# Patient Record
Sex: FEMALE | Race: WHITE | Employment: STUDENT | URBAN - METROPOLITAN AREA
[De-identification: names, ages, dates, MRNs, and addresses within clinical notes are randomized per-mention and may not be internally consistent; named-entity substitution may affect disease eponyms.]

---

## 2017-03-08 ENCOUNTER — APPOINTMENT (OUTPATIENT)
Dept: PHYSICAL THERAPY | Age: 52
End: 2017-03-08

## 2017-03-27 ENCOUNTER — HOSPITAL ENCOUNTER (OUTPATIENT)
Dept: PHYSICAL THERAPY | Age: 52
Discharge: HOME | End: 2017-03-27
Payer: COMMERCIAL

## 2017-03-27 VITALS — HEIGHT: 68 IN | WEIGHT: 158 LBS | BODY MASS INDEX: 23.95 KG/M2

## 2017-03-27 PROCEDURE — 97110 THERAPEUTIC EXERCISES: CPT

## 2017-03-27 PROCEDURE — 97140 MANUAL THERAPY 1/> REGIONS: CPT

## 2017-03-27 PROCEDURE — 97161 PT EVAL LOW COMPLEX 20 MIN: CPT

## 2017-03-27 NOTE — PROGRESS NOTES
[facility]  Physical Therapy Lymphedema Clinic  [address] [place] [address], [address], [facility] 22.    INITIAL EVALUATION    NAME: Beth Oseguera AGE: 47 y.o. 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 She completed our therapy sessions and is now returning for a 6 month evaluation. She reports that she has noticed new swelling in her R lateral trunk that is not improving. Lymphedema Life Impact Scale: Score of 10 and impairment percentage of 15%. See scanned document. Past Medical History: No past medical history on file. Past Surgical History:   Procedure Laterality Date    HX HYSTERECTOMY      HX TONSILLECTOMY     Per patient report:  Node removed from R axilla in June 2015, chemotherapy that began in June 2015. B mastectomies with B axillary node dissection with reconstructive surgery, expander/implant exchange 2/2016, and final surgery in mid 2016 for nipple and areola surgery late May 2016. Current Medications:    No current outpatient prescriptions on file. No current facility-administered medications for this encounter.     Per patient report:  Lexapro 10 mg once a day                                 Tamoxifen 20 mg once a day                                 claritin 10 mg once a day                                 Vitamin D 2000 ui once a day                                 B6 and 12 complex with 1000 mg Biotin once a day                                 Melatonin 10 mg once a day                                 Vagifem 3 times a week                                 Allergies: Allergies   Allergen Reactions    Tetracycline Rash    Per patient report:  Codeine, sulfa, dexamethasone, Compazine, adhesive tape     Prior Level of Function/Social/Work History/Personal factors and/or comorbidities impacting plan of care: 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 She is  and has a family and is performing normal housework and activities in the home. She enjoys gardening. She has had swelling in her R thumb almost constantly but has done well with kinesio taping. She has now developed swelling along her R axilla and lateral chest wall. She will see the plastic surgeon on Wednesday just to ensure that there is not a leak of her implants. She also has the history of B mastectomies, B axillary lymph node dissection and reconstructive surgery   Living Situation: , lives with family, full time student      Trainable Caregiver?: yes, family if needed   Self-care/ADLs:  I     Mobility: I   Sleeping Arrangement:  In a bed   Adaptive Equipment Owned: none   Other: pt has had difficulty getting the correct compression garments from the vendor, [employer]  Previous Therapy:  June-September 2016 in our lymphedema clinic  Compression/Lymphedema Equipment:  Lymphediva arm sleeves and gloves in size small, long                                                                           Flexitouch    SUBJECTIVE:  Pt finally received compression garments from [facility] after a very long time. Many issues with the vendor. She only received two sets of garments and received the gauntlets and not the gloves. She has brought in her garments today for us to sort out and get corrected by the vendor. Patient is reporting new swelling along R axilla and lateral chest wall. She also sees some in the forearm area of the R as well. She has had some swelling noted in the L UE as well but it has resolved at this time. Patients goals for therapy: \"to get the compression garments straight as I will be flying soon for vacation. \"    OBJECTIVE DATA SUMMARY:   EXAMINATION/PRESENTATION/DECISION MAKING:   Pain:  Pain Scale 1: Numeric (0 - 10)     Pain Intensity 1: 2     Pain Location 1: Chest     Pain Orientation 1: Right, Lateral     Patient Stated Pain Goal: 0       Self-Care and ADLs:  Grooming: I Bathing: showers   UB Dressing: I LB Dressing: I   Don/Doff Shoes/Socks:  I Toileting: I   Other: n/a    Skin and Tissue Assessment:  Dermal Status:    (x )  Intact ( )  Dry   ( )  Tenuous ( )  Flaky   ( )  Wound/lesion present ( )  Scars   ( )  Dermatitis    Texture/Consistency:  ( x)  Boggy-R axilla and upper lateral chest wall which is fairly new in onset,  R thumb ( )  Pitting Edema   ( )  Brawny ( )  Combination   ( )  Fibrotic/Woody    Pigmentation/Color Change:  ( x)  Normal ( )  Hemosiderin   ( )  Red ( )  Erythematous   ( )  Hyperpigmented ( )  Hyperlipodermatosclerosis   Anomalies:  ( )  Lymphorrhea ( )  Vesicles   ( )  Petechiae ( )  Warty Vercusis   ( )  Bullae ( )  Papilloma   Circulatory:  ( )  LATANYA ( )  Varicosities:   ( )  Pulses ( )  Vascular studies ruled out DVT in past   ( )  DVT History    Nails:  (x )  Normal  ( )  Fungus  Stemmers Sign: negative  Height:  Height: 5' 8\" (172.7 cm)  Weight:  Weight: 71.7 kg (158 lb)   BMI:  BMI (calculated): 24  (36 or greater: adversely affecting lymphedema)  Wound/Ulcer:  none      Wound Pain:   n/a  Volumetric Measurements: UEs  Right:  2,402 mL (gain of 77 ml since 9/19/16) Left:  2,216 mL (loss of 74 ml since 9/9/16)   % Difference: 8% (increase from 2% since 9/19/16) Dominance: R   (See scanned graph)  Range of Motion: functional times 4 extremities  Strength:  WNL  Sensation:  Intact    Mobility:    Bed/Chair Mobility:  I Transfers: I   Sitting Balance:  normal Standing Balance:  good   Gait:  I gait without any assistive device for community distances Wheelchair Mobility:  n/a   Endurance:  good Stairs: I       Safety:  Patient is alert and oriented:  yes   Safety awareness:  good   Fall Risk?:  no   Patient given written fall prevention handout: Yes   Precautions:  Standard lymphedema precautions to include avoiding blood pressure readings, injections and IVs or other procedures/acts that could lead to broken skin on affected area, and avoiding excessive heat, resistive activity or altitude without compression garment      Based on the above components, the patient evaluation is determined to be of the following complexity level: LOW     Evaluation Time: 10:15-10:30 am  TREATMENT PROVIDED:   1. Treatment description:  Therapeutic Exercise and Procedure: Patient instructed in activity restrictions and exercise guidelines. Therapist demonstrated deep abdominal breathing and a discussion of her home program of lymphedema exercise routines occurred. She has not been performing a routine in quite a while but has instead been performing isolated exercises from the routines throughout the day. She still has her written handouts and has good knowledge of the exercises and is more than willing to get back to daily performance of one set of exercises. Treatment time:  10:30-10:45 am  Minutes: 15    2. Treatment description:  Manual Therapy: Patient instructed in skin care principles and anatomy of the lymphatic system. Therapist able to demonstrate manual lymphatic drainage techniques for the drainage of B UEs and skin care protocol.   Pt was shown her self MLD sequence with the addition of stressing the R AI pathway to help relieve the lateral R chest wall swelling that she is experiencing. She was instructed in using the stationary hand [place] stroke to work the R AI pathway and was able to demonstrate understanding. She had been using her flexitouch nightly but with school consuming so much of her time, she is only able to use the device 2-3 times a week and feels that she is really missing the benefits form use of it nightly. She is still able to apply kinesiotape to her R thumb which she states is really benefiting her. She has brought in her compression arm sleeves and gauntlets that she finally received from the order that was placed in September 2016. The sleeves are correct but pt ordered R gloves and not the R gauntlets that she received. [address] [place] [address] was called and they will exchange the R gauntlets for gloves. Pt will be flying soon and will also need compression sleeves and hand pieces for the L UE as she had axillary lymph nodes removed from that side as well. She was measured today and an order will be submitted to [address] [place] [address] as patient has experience stage 1 lymphedema in the L UE as well. Currently the arm is doing fairly well but in the past she has developed swelling in the warmer months. She also will be risk for swelling with air travel and other high risk activities. Treatment time:  10:45-11 am  Minutes: 15    ASSESSMENT:   Rubio Xavier is a 47 y.o. female who presents with stage 2 R UE/ truncal secondary lymphedema and stage 1 L UE secondary lymphedema. Patient will benefit from complete decongestive therapy (CDT) including manual lymphatic drainage (MLD) technique, short-stretch textile bandages/compression system to decongest limb, and kinesiotaping as appropriate. Patient will receive instruction in proper skin care to recognize signs/symptoms of and prevent infection, therapeutic exercise, and self-MLD for independent home program and restorative lymphatic performance.     This care is medically necessary due to the infection risk with lymphedema and to improve functional activities. CDT is necessary to resolve swelling to allow patient to return to wearing normal clothes and prevent worsening of symptoms, such as infections or hospitalizations. Patient will be independent with home program strategies to allow improved ADL ability and mobility and to allow patient to return to greatest functional independence. Rehabilitation potential is considered to be good. Factors which may influence rehabilitation potential include good knowledge of her home program.  Patient will benefit from 6 month evaluation in physical therapy to optimize improvement in these areas. PLAN OF CARE:   Recommendations and Planned Interventions:  Manual lymph drainage/complete decongestive therapy  Multi-layer compression bandaging (short-stretch)  Compression garment fitting/provision  Lymphedema therapeutic exercise  AROM/PROM/Strength/Coordination  Self-care training  Functional mobility training  Education in skin care and lymphedema precautions  Self-MLD education per home program  Self-bandaging education per home program  Caregiver education as needed  Wound care as needed     GOALS  1. Order for additional compression garments for the L UE and corrected order for the R gloves will be placed thru [facility].  2.  Pt will continue with home program of remedial lymphedema exercises, self MLD and use of the flexitouch, compression garments for high risk activities, and skin care. Patient has participated in goal setting and agrees to work toward plan of care. Patient was instructed to call if questions or concerns arise. Thank you for this referral.  Teresa Odom, PT   Time Calculation: 45 mins    TREATMENT PLAN EFFECTIVE DATES:   3/27/2017 TO 3/27/2017  I have read the above plan of care for Aneita Ahumada. I certify the above prescribed services are required by this patient and are medically necessary.   The above plan of care has been developed in conjunction with the lymphedema/physical therapist.       Physician Signature: ____________________________________Date:______________                                               Ming Ureña.  Baudilio Mejia MD

## 2017-09-13 ENCOUNTER — HOSPITAL ENCOUNTER (OUTPATIENT)
Dept: PHYSICAL THERAPY | Age: 52
Discharge: HOME | End: 2017-09-13
Payer: COMMERCIAL

## 2017-09-13 VITALS — BODY MASS INDEX: 25.67 KG/M2 | WEIGHT: 169.4 LBS | HEIGHT: 68 IN

## 2017-09-13 PROCEDURE — 97140 MANUAL THERAPY 1/> REGIONS: CPT

## 2017-09-13 PROCEDURE — 97161 PT EVAL LOW COMPLEX 20 MIN: CPT

## 2019-04-22 ENCOUNTER — OFFICE VISIT (OUTPATIENT)
Dept: NEUROLOGY | Age: 54
End: 2019-04-22

## 2019-04-22 VITALS
SYSTOLIC BLOOD PRESSURE: 114 MMHG | DIASTOLIC BLOOD PRESSURE: 64 MMHG | WEIGHT: 173.6 LBS | HEART RATE: 107 BPM | HEIGHT: 68 IN | BODY MASS INDEX: 26.31 KG/M2 | OXYGEN SATURATION: 95 %

## 2019-04-22 DIAGNOSIS — G44.86 CERVICOGENIC HEADACHE: ICD-10-CM

## 2019-04-22 DIAGNOSIS — G24.3 CERVICAL DYSTONIA: Primary | ICD-10-CM

## 2019-04-22 RX ORDER — ESTRADIOL 10 UG/1
10 INSERT VAGINAL DAILY
COMMUNITY

## 2019-04-22 RX ORDER — MELATONIN
DAILY
COMMUNITY

## 2019-04-22 RX ORDER — BISMUTH SUBSALICYLATE 262 MG
1 TABLET,CHEWABLE ORAL DAILY
COMMUNITY

## 2019-04-22 RX ORDER — CYCLOBENZAPRINE HCL 10 MG
10 TABLET ORAL
Qty: 90 TAB | Refills: 0 | Status: SHIPPED | OUTPATIENT
Start: 2019-04-22 | End: 2019-08-01 | Stop reason: SINTOL

## 2019-04-22 RX ORDER — SPIRONOLACTONE 50 MG/1
TABLET, FILM COATED ORAL DAILY
COMMUNITY

## 2019-04-22 RX ORDER — GLUCOSAMINE/CHONDR SU A SOD 750-600 MG
TABLET ORAL
COMMUNITY

## 2019-04-22 RX ORDER — LORATADINE 10 MG/1
10 TABLET ORAL
COMMUNITY

## 2019-04-22 RX ORDER — LEVOTHYROXINE SODIUM 50 UG/1
TABLET ORAL
COMMUNITY

## 2019-04-22 NOTE — PATIENT INSTRUCTIONS
Torticollis: Care Instructions Your Care Instructions Torticollis is a severe tightness of the muscles on one side of the neck. The tight muscles can make the head turn to one side, lean to one side, or be pulled forward or backward. It is also called wryneck. Your doctor asked questions about your health and examined you. You may also have had X-rays or other tests. If your doctor thinks another medical problem is causing your tight neck muscles, you may need more tests. Torticollis usually gets better with home care. Your doctor may have you take medicine to relieve pain or relax your muscles. He or she may suggest exercise and physical therapy to help increase flexibility and relieve stress. Your doctor may also have you wear a special collar, called a cervical collar, for a day or two. The collar may help make your neck more comfortable. Follow-up care is a key part of your treatment and safety. Be sure to make and go to all appointments, and call your doctor if you are having problems. It's also a good idea to know your test results and keep a list of the medicines you take. How can you care for yourself at home? · Be safe with medicines. Read and follow all instructions on the label. ? If the doctor gave you a prescription medicine for pain, take it as prescribed. ? If you are not taking a prescription pain medicine, ask your doctor if you can take an over-the-counter medicine. · Try using a heating pad on a low or medium setting for 15 to 20 minutes every 2 or 3 hours. Try a warm shower in place of one session with the heating pad. · Try using an ice pack for 10 to 15 minutes every 2 to 3 hours. Put a thin cloth between the ice and your skin. · If your doctor recommends a cervical collar, wear it exactly as directed. When should you call for help? Call your doctor now or seek immediate medical care if: 
  · You have new or worse numbness in your arms, buttocks, or legs.   · You have new or worse weakness in your arms or legs.  
  · Your neck pain gets worse.  
  · You lose bladder or bowel control.  
 Watch closely for changes in your health, and be sure to contact your doctor if: 
  · You do not get better as expected. Where can you learn more? Go to http://maite-hilda.info/. Enter H099 in the search box to learn more about \"Torticollis: Care Instructions. \" Current as of: September 20, 2018 Content Version: 11.9 © 7143-0053 [employer]. Care instructions adapted under license by [employer] (which disclaims liability or warranty for this information). If you have questions about a medical condition or this instruction, always ask your healthcare professional. [address] any warranty or liability for your use of this information. Headache: Care Instructions Your Care Instructions Headaches have many possible causes. Most headaches aren't a sign of a more serious problem, and they will get better on their own. Home treatment may help you feel better faster. The doctor has checked you carefully, but problems can develop later. If you notice any problems or new symptoms, get medical treatment right away. Follow-up care is a key part of your treatment and safety. Be sure to make and go to all appointments, and call your doctor if you are having problems. It's also a good idea to know your test results and keep a list of the medicines you take. How can you care for yourself at home? · Do not drive if you have taken a prescription pain medicine. · Rest in a quiet, dark room until your headache is gone. Close your eyes and try to relax or go to sleep. Don't watch TV or read. · Put a cold, moist cloth or cold pack on the painful area for 10 to 20 minutes at a time. Put a thin cloth between the cold pack and your skin.  
· Use a warm, moist towel or a heating pad set on low to relax tight shoulder and neck muscles. · Have someone gently massage your neck and shoulders. · Take pain medicines exactly as directed. ? If the doctor gave you a prescription medicine for pain, take it as prescribed. ? If you are not taking a prescription pain medicine, ask your doctor if you can take an over-the-counter medicine. · Be careful not to take pain medicine more often than the instructions allow, because you may get worse or more frequent headaches when the medicine wears off. · Do not ignore new symptoms that occur with a headache, such as a fever, weakness or numbness, vision changes, or confusion. These may be signs of a more serious problem. To prevent headaches · Keep a headache diary so you can figure out what triggers your headaches. Avoiding triggers may help you prevent headaches. Record when each headache began, how long it lasted, and what the pain was like (throbbing, aching, stabbing, or dull). Write down any other symptoms you had with the headache, such as nausea, flashing lights or dark spots, or sensitivity to bright light or loud noise. Note if the headache occurred near your period. List anything that might have triggered the headache, such as certain foods (chocolate, cheese, wine) or odors, smoke, bright light, stress, or lack of sleep. · Find healthy ways to deal with stress. Headaches are most common during or right after stressful times. Take time to relax before and after you do something that has caused a headache in the past. 
· Try to keep your muscles relaxed by keeping good posture. Check your jaw, face, neck, and shoulder muscles for tension, and try relaxing them. When sitting at a desk, change positions often, and stretch for 30 seconds each hour. · Get plenty of sleep and exercise. · Eat regularly and well. Long periods without food can trigger a headache. · Treat yourself to a massage. Some people find that regular massages are very helpful in relieving tension. · Limit caffeine by not drinking too much coffee, tea, or soda. But don't quit caffeine suddenly, because that can also give you headaches. · Reduce eyestrain from computers by blinking frequently and looking away from the computer screen every so often. Make sure you have proper eyewear and that your monitor is set up properly, about an arm's length away. · Seek help if you have depression or anxiety. Your headaches may be linked to these conditions. Treatment can both prevent headaches and help with symptoms of anxiety or depression. When should you call for help? Call 911 anytime you think you may need emergency care. For example, call if: 
  · You have signs of a stroke. These may include: 
? Sudden numbness, paralysis, or weakness in your face, arm, or leg, especially on only one side of your body. ? Sudden vision changes. ? Sudden trouble speaking. ? Sudden confusion or trouble understanding simple statements. ? Sudden problems with walking or balance. ? A sudden, severe headache that is different from past headaches.  
 Call your doctor now or seek immediate medical care if: 
  · You have a new or worse headache.  
  · Your headache gets much worse. Where can you learn more? Go to http://maite-hilda.info/. Enter M271 in the search box to learn more about \"Headache: Care Instructions. \" Current as of: Elke 3, 2018 Content Version: 11.9 © 9280-9357 [employer]. Care instructions adapted under license by [employer] (which disclaims liability or warranty for this information). If you have questions about a medical condition or this instruction, always ask your healthcare professional. [place] [address] any warranty or liability for your use of this information. Neck: Exercises Your Care Instructions Here are some examples of typical rehabilitation exercises for your condition. Start each exercise slowly. Ease off the exercise if you start to have pain. Your doctor or physical therapist will tell you when you can start these exercises and which ones will work best for you. How to do the exercises Neck stretch 1. This stretch works best if you keep your shoulder down as you lean away from it. To help you remember to do this, start by relaxing your shoulders and lightly holding on to your thighs or your chair. 2. Tilt your head toward your shoulder and hold for 15 to 30 seconds. Let the weight of your head stretch your muscles. 3. If you would like a little added stretch, use your hand to gently and steadily pull your head toward your shoulder. For example, keeping your right shoulder down, lean your head to the left. 4. Repeat 2 to 4 times toward each shoulder. Diagonal neck stretch 1. Turn your head slightly toward the direction you will be stretching, and tilt your head diagonally toward your chest and hold for 15 to 30 seconds. 2. If you would like a little added stretch, use your hand to gently and steadily pull your head forward on the diagonal. 
3. Repeat 2 to 4 times toward each side. Dorsal glide stretch 1. Sit or stand tall and look straight ahead. 2. Slowly tuck your chin as you glide your head backward over your body 3. Hold for a count of 6, and then relax for up to 10 seconds. 4. Repeat 8 to 12 times. Chest and shoulder stretch 1. Sit or stand tall and glide your head backward as in the dorsal glide stretch. 2. Raise both arms so that your hands are next to your ears. 3. Take a deep breath, and as you breathe out, lower your elbows down and behind your back. You will feel your shoulder blades slide down and together, and at the same time you will feel a stretch across your chest and the front of your shoulders. 4. Hold for about 6 seconds, and then relax for up to 10 seconds. 5. Repeat 8 to 12 times. Strengthening: Hands on head 1. Move your head backward, forward, and side to side against gentle pressure from your hands, holding each position for about 6 seconds. 2. Repeat 8 to 12 times. Follow-up care is a key part of your treatment and safety. Be sure to make and go to all appointments, and call your doctor if you are having problems. It's also a good idea to know your test results and keep a list of the medicines you take. Where can you learn more? Go to http://maite-hilda.info/. Enter P975 in the search box to learn more about \"Neck: Exercises. \" Current as of: September 20, 2018 Content Version: 11.9 © 9700-4173 [employer]. Care instructions adapted under license by [employer] (which disclaims liability or warranty for this information). If you have questions about a medical condition or this instruction, always ask your healthcare professional. [place] [address] any warranty or liability for your use of this information. Shoulder Blade: Exercises Your Care Instructions Here are some examples of typical exercises for your condition. Start each exercise slowly. Ease off the exercise if you start to have pain. Your doctor or physical therapist will tell you when you can start these exercises and which ones will work best for you. How to do the exercises Shoulder roll 1. Stand tall with your chin slightly tucked. Imagine that a string at the top of your head is pulling you straight up. 2. Keep your arms relaxed. All motion will be in your shoulders. 3. Shrug your shoulders up toward your ears, then up and back. [place] your shoulders down and back, like you're sliding your hands down into your back pants pockets. 4. Repeat the circles at least 2 to 4 times. 5. This exercise is also helpful anytime you want to relax. Lower neck and upper back stretch 1. With your arms about shoulder height, clasp your hands in front of you. 2. Drop your chin toward your chest. 
3. Reach straight forward so you are rounding your upper back. Think about pulling your shoulder blades apart. [place] feel a stretch across your upper back and shoulders. Hold for at least 6 seconds. 4. Repeat 2 to 4 times. Triceps stretch 1. Reach your arm straight up. 2. Keeping your elbow in place, bend your arm and reach your hand down behind your back. 3. With your other hand, apply gentle pressure to the bent elbow. [place] feel a stretch at the back of your upper arm and shoulder. Hold about 6 seconds. 4. Repeat 2 to 4 times with each arm. Shoulder stretch 1. Relax your shoulders. 2. Raise one arm to shoulder height, and reach it across your chest. 
3. Pull the arm slightly toward you with your other arm. This will help you get a gentle stretch. Hold for about 6 seconds. 4. Repeat 2 to 4 times. Shoulder blade squeeze 1. Sit or stand up tall with your arms at your sides. 2. Keep your shoulders relaxed and down, not shrugged. 3. Squeeze your shoulder blades together. Hold for 6 seconds, then relax. 4. Repeat 8 to 12 times. Straight-arm shoulder blade squeeze 1. Sit or stand tall. Relax your shoulders. 2. With palms down, hold your elastic tubing or band straight out in front of you. 3. Start with slight tension in the tubing or band, with your hands about shoulder-width apart. 4. Slowly pull straight out to the sides, squeezing your shoulder blades together. Keep your arms straight and at shoulder height. Slowly release. 5. Repeat 8 to 12 times. Rowing 1. [place] your elastic tubing or band at about waist height. Take one end in each hand. 2. Sit or stand with your feet hip-width apart. 3. Hold your arms straight in front of you. Adjust your distance to create slight tension in the tubing or band. 4. Slightly tuck your chin. Relax your shoulders. 5. Without shrugging your shoulders, pull straight back.  Your elbows will pass alongside your waist. 
 
Pull-downs 1. [place] your elastic tubing or band in the top of a closed door. Take one end in each hand. 2. Either sit or stand, depending on what is more comfortable. If you feel unsteady, sit on a chair. 3. Start with your arms up and comfortably apart, elbows straight. There should be a slight tension in the tubing or band. 4. Slightly tuck your chin, and look straight ahead. 5. Keeping your back straight, slowly pull down and back, bending your elbows. 6. Stop where your hands are level with your chin, in a \"goalpost\" position. 7. Repeat 8 to 12 times. Chest T stretch 1. Lie on your back. Raise your knees so they are bent. Plant your feet on the floor, hip-width apart. 2. Tuck your chin, and relax your shoulders. 3. Reach your arms straight out to the sides. If you don't feel a mild stretch in your shoulders and across your chest, use a foam roll or a tightly rolled blanket under your spine, from your tailbone to your head. 4. Relax in this position for at least 15 to 30 seconds while you breathe normally. Repeat 2 to 4 times. 5. As you get used to this stretch, keep adding a little more time until you are able relax in this position for 2 or 3 minutes. When you can relax for at least 2 minutes, you only need to do the exercise 1 time per session. Chest goalpost stretch 1. Lie on your back. Raise your knees so they are bent. Plant your feet on the floor, hip-width apart. 2. Tuck your chin, and relax your shoulders. 3. Reach your arms straight out to the sides. 4. Bend your arms at the elbows, with your hands pointed toward the top of your head. Your arms should make an L on either side of your head. Your palms should be facing up. 5. If you don't feel a mild stretch in your shoulders and across your chest, use a foam roll or tightly rolled blanket under your spine, from your tailbone to your head. 6. Relax in this position for at least 15 to 30 seconds while you breathe normally. Repeat 2 to 4 times. 7. Each day you do this exercise, add a little more time until you can relax in this position for 2 or 3 minutes. When you can relax for at least 2 minutes, you only need to do the exercise 1 time per session. Follow-up care is a key part of your treatment and safety. Be sure to make and go to all appointments, and call your doctor if you are having problems. It's also a good idea to know your test results and keep a list of the medicines you take. Where can you learn more? Go to http://maite-hilda.info/. Enter [phone] in the search box to learn more about \"Shoulder Blade: Exercises. \" Current as of: September 20, 2018 Content Version: 11.9 © 4429-2088 [employer]. Care instructions adapted under license by [employer] (which disclaims liability or warranty for this information). If you have questions about a medical condition or this instruction, always ask your healthcare professional. [address] any warranty or liability for your use of this information.

## 2019-04-22 NOTE — LETTER
4/22/19 Patient: Rosette Howard YOB: 1965 Date of Visit: 4/22/2019 Deisi Hernandez MD 
[address] [address] VIA Facsimile: [phone] Dear Deisi Hernandez MD, Thank you for referring Ms. Alondra Esparza to [address] [place] [address] for evaluation. My notes for this consultation are attached. If you have questions, please do not hesitate to call me. I look forward to following your patient along with you. Sincerely, Rahul Ortega MD

## 2019-04-22 NOTE — PROGRESS NOTES
NEUROLOGY CLINIC NOTE Patient ID: 
April Mendez [id number] 
49 y.o. 
1965 Date of Consultation:  April 22, 2019 Referring Physician: Dr. Caitlin Hamlin Reason for Consultation:  headaches Chief Complaint Patient presents with  New Patient [place] Headaches History of Present Illness: There are no active problems to display for this patient. Past Medical History:  
Diagnosis Date  Cancer ([address] Breast  
 Thyroid disease Past Surgical History:  
Procedure Laterality Date  BREAST SURGERY PROCEDURE UNLISTED  HX HYSTERECTOMY  HX TONSILLECTOMY Prior to Admission medications Medication Sig Start Date End Date Taking? Authorizing Provider  
liraglutide (SAXENDA) 3 mg/0.5 mL (18 mg/3 mL) pen 3 mg by SubCUTAneous route daily. Yes Provider, Historical  
spironolactone (ALDACTONE) 50 mg tablet Take  by mouth daily. Yes Provider, Historical  
levothyroxine (SYNTHROID) 50 mcg tablet Take  by mouth Daily (before breakfast). Yes Provider, Historical  
estradiol (VAGIFEM) 10 mcg tab vaginal tablet Insert 10 mcg into vagina daily. Yes Provider, Historical  
loratadine (CLARITIN) 10 mg tablet Take 10 mg by mouth. Yes Provider, Historical  
Biotin 2,500 mcg cap Take  by mouth. Yes Provider, Historical  
multivitamin (ONE A DAY) tablet Take 1 Tab by mouth daily. Yes Provider, Historical  
cholecalciferol (VITAMIN D3) 1,000 unit tablet Take  by mouth daily. Yes Provider, Historical  
 
Allergies Allergen Reactions  Codeine Nausea and Vomiting  Compazine [Prochlorperazine] Rash  Dexamethasone Other (comments) Tachycardia  Sulfa (Sulfonamide Antibiotics) Nausea and Vomiting  Tetracycline Rash Social History Tobacco Use  Smoking status: Never Smoker  Smokeless tobacco: Never Used Substance Use Topics  Alcohol use: Yes Alcohol/week: 1.0 oz Types: 2 Glasses of wine per week History reviewed. No pertinent family history. Subjective:  
  
Steven Betancourt is a 49 y.o. IXYA with history of breast cancer and thyroid disease who was referred here by Dr. Teresa Weinberg for further evaluation of his headaches. Per patient she has had two episodes of headaches. First episode was 2/17/2019 while having sex. Abrupt onset of back of the head pain. Like a baseball bat hit her from behind the head. 10/10 and worst pain ever in her life. She could not breath or speak, just held the back of her head due to the pain. No associated nausea or vomiting. Lasted for 1 hour. Residual headache 1-2/10 for 2 weeks. Did not try to take any OTC medications. Per patient she saw her PCP. Note from Dr. Roland Bishop dated 2/27/2019 was reviewed. Mentions the headache during sex. Ordered a brain MRA which likely was not approved by her insurance. Patient was advised to go to the nearest ER if symptoms recur. Patient then referred here. Few weeks PTC while doing physical therapy for her feet she had another bout of same but less intense headache. She was laying down and pushing her feet on a resistance. Develop same headache but less intense. Back of the head pain, 4-5/10, pressure pain at the occiput. Lasting until the evening and went away after several days. None since then. Outside reports reviewed: office notes Review of Systems: A comprehensive review of systems was performed:  
Constitutional: positive for fatigue Eyes: positive for none Ears, nose, mouth, throat, and face: positive for none Respiratory: positive for none Cardiovascular: positive for none Gastrointestinal: positive for none Genitourinary: positive for none Integument/breast: positive for none Hematologic/lymphatic: positive for none Musculoskeletal: positive for joint pain Neurological: positive for headache, paresthesia Behavioral/Psych: positive for none Endocrine: positive for none Allergic/Immunologic: positive for none Objective:  
 
Visit Vitals /64 Pulse (!) 107 Ht 5' 8\" (1.727 m) Wt 173 lb 9.6 oz (78.7 kg) SpO2 95% BMI 26.40 kg/m² PHYSICAL EXAM: 
 
General Appearance: Alert, patient appears stated age. General:  Well developed, well nourished patent in no apparent distress. Head/Face: The head is normocephalic and atraumatic. Eyes: Conjunctivae appear normal. Sclera appear normal and non-icteric. Nose (and Sinus):   No abnormality of the nose or sinuses is noted. Oral:   Throat is clear. Lymphatics:  No lymphadenopathy in the neck/head. Neck and Thyroid:   No bruits noted in the neck. Respiratory:  Lungs clear to auscultation. Cardiovascular:  Palpation and auscultation: regular rate and rhythm. Extremity: No joint swelling, erythema or pedal edema. NEUROLOGICAL EXAM: 
 
Appearance: The patient is well developed, well nourished, provides a coherent history and is in no acute distress. Mental Status: Oriented to time, place and person. Fluent, no aphasia or dysarthria. Mood and affect appropriate. Cranial Nerves:   Intact visual fields. VA 20/20 OU on near vision with correction. FELIZ, EOM's full, no nystagmus, no ptosis. Facial sensation is normal. Corneal reflexes are intact. Facial movement is symmetric. Hearing is normal bilaterally. Palate is midline with normal elevation. Sternocleidomastoid and trapezius muscles are normal. Tongue is midline. Motor:  5/5 strength in upper and lower proximal and distal muscles. Normal bulk and tone. No pronator drift. Reflexes:   Deep tendon reflexes 2+/4 and symmetrical. Downgoing toes. Sensory:   Normal to touch, pinprick and vibration. Gait:  Normal gait. No Romberg. Can do tandem walking. Tremor:   No tremor noted. Cerebellar:  Intact FTN/LISA/HTS. Neurovascular:  Normal heart sounds and regular rhythm, peripheral pulses intact, and no carotid bruits. Cervical range of motion measurement: 
     Degrees Head flexion   60 Head extension  37 Right lateral head flexion 15 Left lateral head flexion 20 Right head rotation  67 Left head rotation  62 Moderate restriction head extension, R>L lateral head flexion and L>R head rotation Anterior head posture (3 FB off shoulder) with shoulders rotated in 
(+) tenderness on palpation bilateral occiput, neck and shoulder muscles Assessment:  
Cervicogenic headache Cervical dystonia Plan:  
Neurological examination is nonfocal.  No indication currently to do any neuroimaging. History and exam reveals elements of cervicogenic headaches and occipital neuralgia due to poor anterior head posture. Patient was advised to correct her anterior head posture. Trial of muscle relaxants. Trial of cyclobenzaprine 10 mg at bedtime initially and up to 10 mg 3 times daily if necessary. Prescription was provided. Patient was advised to take ibuprofen 600 to 800 mg or Aleve 440 mg x 30 minutes to an hour prior to any planned sexual activity. See if this prevents the headache. Okay to take over-the-counter medications for abortive therapy. Patient is provided with brochure for neck and shoulder exercises to do. Patient was advised to go to the nearest emergency room if severe headaches occur with other neurological deficits. Patient was reassured. Exam reveals findings typically seen in cervical dystonia. Patient has an anterior head posture with shoulders rotated then. Range of motion measurements was done and confirmed restrictions in all directions except head flexion. The patient was advised to correct her anterior head posture. Use headrest at work, at home and while driving. Use wireless headsets. Do not carry anything on the shoulder. Use only one pillow at most when sleeping at night. Patient was referred to physical therapy for more aggressive manipulation.   If involved trial of medications does not help then trial of anti-spasticity medications. If conservative management fails then patient may be a candidate for chemodenervation with Botox under EMG guidance. All questions and concerns were answered. This note was created using voice recognition software. Despite editing, there may be syntax errors.

## 2019-05-23 ENCOUNTER — OFFICE VISIT (OUTPATIENT)
Dept: NEUROLOGY | Age: 54
End: 2019-05-23

## 2019-05-23 VITALS
OXYGEN SATURATION: 98 % | WEIGHT: 166 LBS | HEART RATE: 95 BPM | DIASTOLIC BLOOD PRESSURE: 60 MMHG | SYSTOLIC BLOOD PRESSURE: 92 MMHG | BODY MASS INDEX: 25.16 KG/M2 | HEIGHT: 68 IN

## 2019-05-23 DIAGNOSIS — G44.86 CERVICOGENIC HEADACHE: Primary | ICD-10-CM

## 2019-05-23 DIAGNOSIS — G24.3 CERVICAL DYSTONIA: ICD-10-CM

## 2019-05-23 NOTE — PROGRESS NOTES
NEUROLOGY CLINIC NOTE    Patient ID:  Scott Dunn  [id number]  84 y.o.  1965    Date of Visit:  May 23, 2019    Referring Physician: Dr. Fiona Sharma    Reason for Visit:  headaches  Chief Complaint   Patient presents with    Follow-up       History of Present Illness: There are no active problems to display for this patient. Past Medical History:   Diagnosis Date    Cancer [facility])     Breast    Thyroid disease       Past Surgical History:   Procedure Laterality Date    BREAST SURGERY PROCEDURE UNLISTED      HX HYSTERECTOMY      HX TONSILLECTOMY        Prior to Admission medications    Medication Sig Start Date End Date Taking? Authorizing Provider   liraglutide (SAXENDA) 3 mg/0.5 mL (18 mg/3 mL) pen 3 mg by SubCUTAneous route daily. Yes Provider, Historical   spironolactone (ALDACTONE) 50 mg tablet Take  by mouth daily. Yes Provider, Historical   levothyroxine (SYNTHROID) 50 mcg tablet Take  by mouth Daily (before breakfast). Yes Provider, Historical   estradiol (VAGIFEM) 10 mcg tab vaginal tablet Insert 10 mcg into vagina daily. Yes Provider, Historical   loratadine (CLARITIN) 10 mg tablet Take 10 mg by mouth. Yes Provider, Historical   Biotin 2,500 mcg cap Take  by mouth. Yes Provider, Historical   multivitamin (ONE A DAY) tablet Take 1 Tab by mouth daily. Yes Provider, Historical   cholecalciferol (VITAMIN D3) 1,000 unit tablet Take  by mouth daily. Yes Provider, Historical   cyclobenzaprine (FLEXERIL) 10 mg tablet Take 1 Tab by mouth three (3) times daily as needed for Muscle Spasm(s).  Take 1 at bedtime x 1 wk; then 1 BID x 1 wk; then 1 TID 4/22/19   Wyatt Ruiz MD     Allergies   Allergen Reactions    Codeine Nausea and Vomiting    Compazine [Prochlorperazine] Rash    Dexamethasone Other (comments)     Tachycardia      Sulfa (Sulfonamide Antibiotics) Nausea and Vomiting    Tetracycline Rash      Social History     Tobacco Use    Smoking status: Never Smoker  Smokeless tobacco: Never Used   Substance Use Topics    Alcohol use: Yes     Alcohol/week: 1.0 oz     Types: 2 Glasses of wine per week      No family history on file. Subjective:      Xuan Perdomo is a 49 y.o. OIOZ with history of breast cancer and thyroid disease who was referred here by Dr. Evelia Jose for further evaluation of his headaches. Per patient she has had two episodes of headaches. First episode was 2/17/2019 while having sex. Abrupt onset of back of the head pain. Like a baseball bat hit her from behind the head. 10/10 and worst pain ever in her life. She could not breath or speak, just held the back of her head due to the pain. No associated nausea or vomiting. Lasted for 1 hour. Residual headache 1-2/10 for 2 weeks. Did not try to take any OTC medications. Per patient she saw her PCP. Note from Dr. Dorn Olszewski dated 2/27/2019 was reviewed. Mentions the headache during sex. Ordered a brain MRA which likely was not approved by her insurance. Patient was advised to go to the nearest ER if symptoms recur. Patient then referred here. Few weeks PTC while doing physical therapy for her feet she had another bout of same but less intense headache. She was laying down and pushing her feet on a resistance. Develop same headache but less intense. Back of the head pain, 4-5/10, pressure pain at the occiput. Lasting until the evening and went away after several days. None since then. Since the last visit on 4/22/2019, patient reports significant improvement of her condition. No recurrence of severe headache. Patient is taking cyclobenzaprine 10 mg at bedtime. Denies any side effects. Patient is aware of her posture and tries to correct it. She is doing the neck and shoulder exercises. Patient is also undergoing physical therapy with benefit. She is happy with her progress. No new complaints.     Outside reports reviewed: none    Review of Systems:    A comprehensive review of systems was performed and was negative      Objective:     Visit Vitals  BP 92/60   Pulse 95   Ht 5' 8\" (1.727 m)   Wt 166 lb (75.3 kg)   SpO2 98%   BMI 25.24 kg/m²       PHYSICAL EXAM:    NEUROLOGICAL EXAM:    Appearance: The patient is well developed, well nourished, provides a coherent history and is in no acute distress. Mental Status: Oriented to time, place and person. Fluent, no aphasia or dysarthria. Mood and affect appropriate. Cranial Nerves:   II - XII were intact        Motor:  5/5 strength. Normal bulk and tone. No pronator drift. Reflexes:   Deep tendon reflexes 2+/4 and symmetrical. Downgoing toes. Sensory:   Normal to touch, pinprick and vibration. Gait:  Normal gait. No Romberg. Tremor:   No tremor noted. Cerebellar:  Intact FTN/LISA/HTS. Cervical range of motion measurement:       Degrees   Head flexion   70 (60)   Head extension  42 (37)   Right lateral head flexion 30 (15)   Left lateral head flexion 30 (20)   Right head rotation  65 (67)   Left head rotation  80 (62)    Mild to moderate restriction head extension, bilateral lateral head flexion and right head rotation  Anterior head posture (2 FB off shoulder) with shoulders rotated in    Assessment:   Cervicogenic headache  Cervical dystonia    Plan:   Neurological examination is unchanged. It is still nonfocal.  Still no indication currently to do any neuroimaging. History and exam reveals elements of cervicogenic headaches and occipital neuralgia due to poor anterior head posture. Patient was advised to correct her anterior head posture. Continue Cyclobenzaprine 10 mg at bedtime and up to 10 mg 3 times daily if necessary. Patient was previously advised to take ibuprofen 600 to 800 mg or Aleve 440 mg x 30 minutes to an hour prior to any planned sexual activity. See if this prevents the headache. Okay to take over-the-counter medications for abortive therapy. Continue neck and shoulder exercises.   Patient was advised to go to the nearest emergency room if severe headaches occur with other neurological deficits. Exam reveals findings typically seen in cervical dystonia. Patient has an anterior head posture with shoulders rotated then. Range of motion measurements was done and revealed restrictions but with improvement. The patient was encouraged to continue to correct her anterior head posture. Use headrest at work, at home and while driving. Use wireless headsets. Do not carry anything on the shoulder. Use only one pillow at most when sleeping at night. Use a soft collar. Obtain back support and posture bra. Continue physical therapy for more aggressive manipulation. If conservative management fails then patient may be a candidate for chemodenervation with Botox under EMG guidance. All questions and concerns were answered. This note was created using voice recognition software. Despite editing, there may be syntax errors.

## 2019-05-23 NOTE — PATIENT INSTRUCTIONS
Torticollis: Care Instructions  Your Care Instructions  Torticollis is a severe tightness of the muscles on one side of the neck. The tight muscles can make the head turn to one side, lean to one side, or be pulled forward or backward. It is also called wryneck. Your doctor asked questions about your health and examined you. You may also have had X-rays or other tests. If your doctor thinks another medical problem is causing your tight neck muscles, you may need more tests. Torticollis usually gets better with home care. Your doctor may have you take medicine to relieve pain or relax your muscles. He or she may suggest exercise and physical therapy to help increase flexibility and relieve stress. Your doctor may also have you wear a special collar, called a cervical collar, for a day or two. The collar may help make your neck more comfortable. Follow-up care is a key part of your treatment and safety. Be sure to make and go to all appointments, and call your doctor if you are having problems. It's also a good idea to know your test results and keep a list of the medicines you take. How can you care for yourself at home? · Be safe with medicines. Read and follow all instructions on the label. ? If the doctor gave you a prescription medicine for pain, take it as prescribed. ? If you are not taking a prescription pain medicine, ask your doctor if you can take an over-the-counter medicine. · Try using a heating pad on a low or medium setting for 15 to 20 minutes every 2 or 3 hours. Try a warm shower in place of one session with the heating pad. · Try using an ice pack for 10 to 15 minutes every 2 to 3 hours. Put a thin cloth between the ice and your skin. · If your doctor recommends a cervical collar, wear it exactly as directed. When should you call for help?   Call your doctor now or seek immediate medical care if:    · You have new or worse numbness in your arms, buttocks, or legs.     · You have new or worse weakness in your arms or legs.     · Your neck pain gets worse.     · You lose bladder or bowel control.    Watch closely for changes in your health, and be sure to contact your doctor if:    · You do not get better as expected. Where can you learn more? Go to http://maite-hilda.info/. Enter R003 in the search box to learn more about \"Torticollis: Care Instructions. \"  Current as of: September 20, 2018  Content Version: 11.9  © 2572-8606 [employer]. Care instructions adapted under license by [employer] (which disclaims liability or warranty for this information). If you have questions about a medical condition or this instruction, always ask your healthcare professional. [address] any warranty or liability for your use of this information. Neck: Exercises  Your Care Instructions  Here are some examples of typical rehabilitation exercises for your condition. Start each exercise slowly. Ease off the exercise if you start to have pain. Your doctor or physical therapist will tell you when you can start these exercises and which ones will work best for you. How to do the exercises  Neck stretch    1. This stretch works best if you keep your shoulder down as you lean away from it. To help you remember to do this, start by relaxing your shoulders and lightly holding on to your thighs or your chair. 2. Tilt your head toward your shoulder and hold for 15 to 30 seconds. Let the weight of your head stretch your muscles. 3. If you would like a little added stretch, use your hand to gently and steadily pull your head toward your shoulder. For example, keeping your right shoulder down, lean your head to the left. 4. Repeat 2 to 4 times toward each shoulder. Diagonal neck stretch    1.  Turn your head slightly toward the direction you will be stretching, and tilt your head diagonally toward your chest and hold for 15 to 30 seconds. 2. If you would like a little added stretch, use your hand to gently and steadily pull your head forward on the diagonal.  3. Repeat 2 to 4 times toward each side. Dorsal glide stretch    1. Sit or stand tall and look straight ahead. 2. Slowly tuck your chin as you glide your head backward over your body  3. Hold for a count of 6, and then relax for up to 10 seconds. 4. Repeat 8 to 12 times. Chest and shoulder stretch    1. Sit or stand tall and glide your head backward as in the dorsal glide stretch. 2. Raise both arms so that your hands are next to your ears. 3. Take a deep breath, and as you breathe out, lower your elbows down and behind your back. You will feel your shoulder blades slide down and together, and at the same time you will feel a stretch across your chest and the front of your shoulders. 4. Hold for about 6 seconds, and then relax for up to 10 seconds. 5. Repeat 8 to 12 times. Strengthening: Hands on head    1. Move your head backward, forward, and side to side against gentle pressure from your hands, holding each position for about 6 seconds. 2. Repeat 8 to 12 times. Follow-up care is a key part of your treatment and safety. Be sure to make and go to all appointments, and call your doctor if you are having problems. It's also a good idea to know your test results and keep a list of the medicines you take. Where can you learn more? Go to http://maite-hilda.info/. Enter P975 in the search box to learn more about \"Neck: Exercises. \"  Current as of: September 20, 2018  Content Version: 11.9  © 4114-1358 [employer]. Care instructions adapted under license by [employer] (which disclaims liability or warranty for this information).  If you have questions about a medical condition or this instruction, always ask your healthcare professional. [address] any warranty or liability for your use of this information. Shoulder Blade: Exercises  Your Care Instructions  Here are some examples of typical exercises for your condition. Start each exercise slowly. Ease off the exercise if you start to have pain. Your doctor or physical therapist will tell you when you can start these exercises and which ones will work best for you. How to do the exercises  Shoulder roll    1. Stand tall with your chin slightly tucked. Imagine that a string at the top of your head is pulling you straight up. 2. Keep your arms relaxed. All motion will be in your shoulders. 3. Shrug your shoulders up toward your ears, then up and back. [place] your shoulders down and back, like you're sliding your hands down into your back pants pockets. 4. Repeat the circles at least 2 to 4 times. 5. This exercise is also helpful anytime you want to relax. Lower neck and upper back stretch    1. With your arms about shoulder height, clasp your hands in front of you. 2. Drop your chin toward your chest.  3. Reach straight forward so you are rounding your upper back. Think about pulling your shoulder blades apart. Mayra Evans feel a stretch across your upper back and shoulders. Hold for at least 6 seconds. 4. Repeat 2 to 4 times. Triceps stretch    1. Reach your arm straight up. 2. Keeping your elbow in place, bend your arm and reach your hand down behind your back. 3. With your other hand, apply gentle pressure to the bent elbow. Mayra Evans feel a stretch at the back of your upper arm and shoulder. Hold about 6 seconds. 4. Repeat 2 to 4 times with each arm. Shoulder stretch    1. Relax your shoulders. 2. Raise one arm to shoulder height, and reach it across your chest.  3. Pull the arm slightly toward you with your other arm. This will help you get a gentle stretch. Hold for about 6 seconds. 4. Repeat 2 to 4 times. Shoulder blade squeeze    1. Sit or stand up tall with your arms at your sides.   2. Keep your shoulders relaxed and down, not shrugged. 3. Squeeze your shoulder blades together. Hold for 6 seconds, then relax. 4. Repeat 8 to 12 times. Straight-arm shoulder blade squeeze    1. Sit or stand tall. Relax your shoulders. 2. With palms down, hold your elastic tubing or band straight out in front of you. 3. Start with slight tension in the tubing or band, with your hands about shoulder-width apart. 4. Slowly pull straight out to the sides, squeezing your shoulder blades together. Keep your arms straight and at shoulder height. Slowly release. 5. Repeat 8 to 12 times. Rowing    1. [place] your elastic tubing or band at about waist height. Take one end in each hand. 2. Sit or stand with your feet hip-width apart. 3. Hold your arms straight in front of you. Adjust your distance to create slight tension in the tubing or band. 4. Slightly tuck your chin. Relax your shoulders. 5. Without shrugging your shoulders, pull straight back. Your elbows will pass alongside your waist.    Pull-downs    1. [place] your elastic tubing or band in the top of a closed door. Take one end in each hand. 2. Either sit or stand, depending on what is more comfortable. If you feel unsteady, sit on a chair. 3. Start with your arms up and comfortably apart, elbows straight. There should be a slight tension in the tubing or band. 4. Slightly tuck your chin, and look straight ahead. 5. Keeping your back straight, slowly pull down and back, bending your elbows. 6. Stop where your hands are level with your chin, in a \"goalpost\" position. 7. Repeat 8 to 12 times. Chest T stretch    1. Lie on your back. Raise your knees so they are bent. Plant your feet on the floor, hip-width apart. 2. Tuck your chin, and relax your shoulders. 3. Reach your arms straight out to the sides. If you don't feel a mild stretch in your shoulders and across your chest, use a foam roll or a tightly rolled blanket under your spine, from your tailbone to your head.   4. Relax in this position for at least 15 to 30 seconds while you breathe normally. Repeat 2 to 4 times. 5. As you get used to this stretch, keep adding a little more time until you are able relax in this position for 2 or 3 minutes. When you can relax for at least 2 minutes, you only need to do the exercise 1 time per session. Chest goalpost stretch    1. Lie on your back. Raise your knees so they are bent. Plant your feet on the floor, hip-width apart. 2. Tuck your chin, and relax your shoulders. 3. Reach your arms straight out to the sides. 4. Bend your arms at the elbows, with your hands pointed toward the top of your head. Your arms should make an L on either side of your head. Your palms should be facing up. 5. If you don't feel a mild stretch in your shoulders and across your chest, use a foam roll or tightly rolled blanket under your spine, from your tailbone to your head. 6. Relax in this position for at least 15 to 30 seconds while you breathe normally. Repeat 2 to 4 times. 7. Each day you do this exercise, add a little more time until you can relax in this position for 2 or 3 minutes. When you can relax for at least 2 minutes, you only need to do the exercise 1 time per session. Follow-up care is a key part of your treatment and safety. Be sure to make and go to all appointments, and call your doctor if you are having problems. It's also a good idea to know your test results and keep a list of the medicines you take. Where can you learn more? Go to http://maite-hilda.info/. Enter ([phone] 1911 6789 in the search box to learn more about \"Shoulder Blade: Exercises. \"  Current as of: September 20, 2018  Content Version: 11.9  © 7943-3231 [employer]. Care instructions adapted under license by [employer] (which disclaims liability or warranty for this information).  If you have questions about a medical condition or this instruction, always ask your healthcare professional. [employer] disclaims any warranty or liability for your use of this information.

## 2019-05-23 NOTE — LETTER
6/3/19 Patient: Denise Schneider YOB: 1965 Date of Visit: 5/23/2019 Laina James MD 
[address] [address] VIA Facsimile: [phone] Dear Laina James MD, Thank you for referring Ms. Jess Emerson to [address] [place] [address] for evaluation. My notes for this consultation are attached. If you have questions, please do not hesitate to call me. I look forward to following your patient along with you. Sincerely, Karine Guerrero MD

## 2019-08-01 ENCOUNTER — OFFICE VISIT (OUTPATIENT)
Dept: NEUROLOGY | Age: 54
End: 2019-08-01

## 2019-08-01 VITALS
WEIGHT: 157 LBS | BODY MASS INDEX: 23.79 KG/M2 | DIASTOLIC BLOOD PRESSURE: 74 MMHG | SYSTOLIC BLOOD PRESSURE: 118 MMHG | OXYGEN SATURATION: 98 % | HEART RATE: 98 BPM | HEIGHT: 68 IN

## 2019-08-01 DIAGNOSIS — G44.86 CERVICOGENIC HEADACHE: Primary | ICD-10-CM

## 2019-08-01 DIAGNOSIS — G24.3 CERVICAL DYSTONIA: ICD-10-CM

## 2019-08-01 RX ORDER — CHLORZOXAZONE 500 MG/1
500 TABLET ORAL 3 TIMES DAILY
Qty: 90 TAB | Refills: 1 | Status: SHIPPED | OUTPATIENT
Start: 2019-08-01

## 2019-08-01 NOTE — LETTER
8/1/19 Patient: Derick Finn YOB: 1965 Date of Visit: 8/1/2019 Christian Grissom MD 
[address] [address] VIA Facsimile: [phone] Dear Christian Grissom MD, Thank you for referring Ms. Victor M Hamlin to [address] [place] [address] for evaluation. My notes for this consultation are attached. If you have questions, please do not hesitate to call me. I look forward to following your patient along with you. Sincerely, Brenna Zazueta MD

## 2019-08-01 NOTE — PATIENT INSTRUCTIONS
Torticollis: Care Instructions Your Care Instructions Torticollis is a severe tightness of the muscles on one side of the neck. The tight muscles can make the head turn to one side, lean to one side, or be pulled forward or backward. It is also called wryneck. Your doctor asked questions about your health and examined you. You may also have had X-rays or other tests. If your doctor thinks another medical problem is causing your tight neck muscles, you may need more tests. Torticollis usually gets better with home care. Your doctor may have you take medicine to relieve pain or relax your muscles. He or she may suggest exercise and physical therapy to help increase flexibility and relieve stress. Your doctor may also have you wear a special collar, called a cervical collar, for a day or two. The collar may help make your neck more comfortable. Follow-up care is a key part of your treatment and safety. Be sure to make and go to all appointments, and call your doctor if you are having problems. It's also a good idea to know your test results and keep a list of the medicines you take. How can you care for yourself at home? · Be safe with medicines. Read and follow all instructions on the label. ? If the doctor gave you a prescription medicine for pain, take it as prescribed. ? If you are not taking a prescription pain medicine, ask your doctor if you can take an over-the-counter medicine. · Try using a heating pad on a low or medium setting for 15 to 20 minutes every 2 or 3 hours. Try a warm shower in place of one session with the heating pad. · Try using an ice pack for 10 to 15 minutes every 2 to 3 hours. Put a thin cloth between the ice and your skin. · If your doctor recommends a cervical collar, wear it exactly as directed. When should you call for help? Call your doctor now or seek immediate medical care if: 
  · You have new or worse numbness in your arms, buttocks, or legs.   · You have new or worse weakness in your arms or legs.  
  · Your neck pain gets worse.  
  · You lose bladder or bowel control.  
 Watch closely for changes in your health, and be sure to contact your doctor if: 
  · You do not get better as expected. Where can you learn more? Go to http://maite-hilda.info/. Enter P619 in the search box to learn more about \"Torticollis: Care Instructions. \" Current as of: September 20, 2018 Content Version: 12.1 © 3834-7969 [employer]. Care instructions adapted under license by [employer] (which disclaims liability or warranty for this information). If you have questions about a medical condition or this instruction, always ask your healthcare professional. [address] any warranty or liability for your use of this information. Neck: Exercises Introduction Here are some examples of exercises for you to try. The exercises may be suggested for a condition or for rehabilitation. Start each exercise slowly. Ease off the exercises if you start to have pain. You will be told when to start these exercises and which ones will work best for you. How to do the exercises Neck stretch 1. This stretch works best if you keep your shoulder down as you lean away from it. To help you remember to do this, start by relaxing your shoulders and lightly holding on to your thighs or your chair. 2. Tilt your head toward your shoulder and hold for 15 to 30 seconds. Let the weight of your head stretch your muscles. 3. If you would like a little added stretch, use your hand to gently and steadily pull your head toward your shoulder. For example, keeping your right shoulder down, lean your head to the left. 4. Repeat 2 to 4 times toward each shoulder. Diagonal neck stretch 1.  Turn your head slightly toward the direction you will be stretching, and tilt your head diagonally toward your chest and hold for 15 to 30 seconds. 2. If you would like a little added stretch, use your hand to gently and steadily pull your head forward on the diagonal. 
3. Repeat 2 to 4 times toward each side. Dorsal glide stretch 1. Sit or stand tall and look straight ahead. 2. Slowly tuck your chin as you glide your head backward over your body 3. Hold for a count of 6, and then relax for up to 10 seconds. 4. Repeat 8 to 12 times. Chest and shoulder stretch 1. Sit or stand tall and glide your head backward as in the dorsal glide stretch. 2. Raise both arms so that your hands are next to your ears. 3. Take a deep breath, and as you breathe out, lower your elbows down and behind your back. You will feel your shoulder blades slide down and together, and at the same time you will feel a stretch across your chest and the front of your shoulders. 4. Hold for about 6 seconds, and then relax for up to 10 seconds. 5. Repeat 8 to 12 times. Strengthening: Hands on head 1. Move your head backward, forward, and side to side against gentle pressure from your hands, holding each position for about 6 seconds. 2. Repeat 8 to 12 times. Follow-up care is a key part of your treatment and safety. Be sure to make and go to all appointments, and call your doctor if you are having problems. It's also a good idea to know your test results and keep a list of the medicines you take. Where can you learn more? Go to http://maite-hilda.info/. Enter P975 in the search box to learn more about \"Neck: Exercises. \" Current as of: September 20, 2018 Content Version: 12.1 © 6050-8112 [employer]. Care instructions adapted under license by [employer] (which disclaims liability or warranty for this information).  If you have questions about a medical condition or this instruction, always ask your healthcare professional. [employer] disclaims any warranty or liability for your use of this information. Shoulder Blade: Exercises Your Care Instructions Here are some examples of typical exercises for your condition. Start each exercise slowly. Ease off the exercise if you start to have pain. Your doctor or physical therapist will tell you when you can start these exercises and which ones will work best for you. How to do the exercises Shoulder roll 1. Stand tall with your chin slightly tucked. Imagine that a string at the top of your head is pulling you straight up. 2. Keep your arms relaxed. All motion will be in your shoulders. 3. Shrug your shoulders up toward your ears, then up and back. [place] your shoulders down and back, like you're sliding your hands down into your back pants pockets. 4. Repeat the circles at least 2 to 4 times. 5. This exercise is also helpful anytime you want to relax. Lower neck and upper back stretch 1. With your arms about shoulder height, clasp your hands in front of you. 2. Drop your chin toward your chest. 
3. Reach straight forward so you are rounding your upper back. Think about pulling your shoulder blades apart. [place] feel a stretch across your upper back and shoulders. Hold for at least 6 seconds. 4. Repeat 2 to 4 times. Triceps stretch 1. Reach your arm straight up. 2. Keeping your elbow in place, bend your arm and reach your hand down behind your back. 3. With your other hand, apply gentle pressure to the bent elbow. [place] feel a stretch at the back of your upper arm and shoulder. Hold about 6 seconds. 4. Repeat 2 to 4 times with each arm. Shoulder stretch 1. Relax your shoulders. 2. Raise one arm to shoulder height, and reach it across your chest. 
3. Pull the arm slightly toward you with your other arm. This will help you get a gentle stretch. Hold for about 6 seconds. 4. Repeat 2 to 4 times. Shoulder blade squeeze 1. Sit or stand up tall with your arms at your sides. 2. Keep your shoulders relaxed and down, not shrugged. 3. Squeeze your shoulder blades together. Hold for 6 seconds, then relax. 4. Repeat 8 to 12 times. Straight-arm shoulder blade squeeze 1. Sit or stand tall. Relax your shoulders. 2. With palms down, hold your elastic tubing or band straight out in front of you. 3. Start with slight tension in the tubing or band, with your hands about shoulder-width apart. 4. Slowly pull straight out to the sides, squeezing your shoulder blades together. Keep your arms straight and at shoulder height. Slowly release. 5. Repeat 8 to 12 times. Rowing 1. [place] your elastic tubing or band at about waist height. Take one end in each hand. 2. Sit or stand with your feet hip-width apart. 3. Hold your arms straight in front of you. Adjust your distance to create slight tension in the tubing or band. 4. Slightly tuck your chin. Relax your shoulders. 5. Without shrugging your shoulders, pull straight back. Your elbows will pass alongside your waist. 
 
Pull-downs 1. [place] your elastic tubing or band in the top of a closed door. Take one end in each hand. 2. Either sit or stand, depending on what is more comfortable. If you feel unsteady, sit on a chair. 3. Start with your arms up and comfortably apart, elbows straight. There should be a slight tension in the tubing or band. 4. Slightly tuck your chin, and look straight ahead. 5. Keeping your back straight, slowly pull down and back, bending your elbows. 6. Stop where your hands are level with your chin, in a \"goalpost\" position. 7. Repeat 8 to 12 times. Chest T stretch 1. Lie on your back. Raise your knees so they are bent. Plant your feet on the floor, hip-width apart. 2. Tuck your chin, and relax your shoulders. 3. Reach your arms straight out to the sides.  If you don't feel a mild stretch in your shoulders and across your chest, use a foam roll or a tightly rolled blanket under your spine, from your tailbone to your head. 4. Relax in this position for at least 15 to 30 seconds while you breathe normally. Repeat 2 to 4 times. 5. As you get used to this stretch, keep adding a little more time until you are able relax in this position for 2 or 3 minutes. When you can relax for at least 2 minutes, you only need to do the exercise 1 time per session. Chest goalpost stretch 1. Lie on your back. Raise your knees so they are bent. Plant your feet on the floor, hip-width apart. 2. Tuck your chin, and relax your shoulders. 3. Reach your arms straight out to the sides. 4. Bend your arms at the elbows, with your hands pointed toward the top of your head. Your arms should make an L on either side of your head. Your palms should be facing up. 5. If you don't feel a mild stretch in your shoulders and across your chest, use a foam roll or tightly rolled blanket under your spine, from your tailbone to your head. 6. Relax in this position for at least 15 to 30 seconds while you breathe normally. Repeat 2 to 4 times. 7. Each day you do this exercise, add a little more time until you can relax in this position for 2 or 3 minutes. When you can relax for at least 2 minutes, you only need to do the exercise 1 time per session. Follow-up care is a key part of your treatment and safety. Be sure to make and go to all appointments, and call your doctor if you are having problems. It's also a good idea to know your test results and keep a list of the medicines you take. Where can you learn more? Go to http://maite-hilda.info/. Enter [phone] in the search box to learn more about \"Shoulder Blade: Exercises. \" Current as of: September 20, 2018 Content Version: 12.1 © 3598-5466 [employer].  Care instructions adapted under license by [address] (which disclaims liability or warranty for this information). If you have questions about a medical condition or this instruction, always ask your healthcare professional. [address] any warranty or liability for your use of this information.

## 2023-01-01 NOTE — PROGRESS NOTES
[facility]  Physical Therapy Lymphedema Clinic  [address] [facility], [address] [place] [address], [place] [employer] [facility].    INITIAL EVALUATION with DISCHARGE    NAME: Gunjan See AGE: 47 y.o. GENDER: female  DATE: 9/13/2017  REFERRING PHYSICIAN:  Cate Magallanes. 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 She completed our therapy sessions and is now returning for a 6 month evaluation. She reports that she has noticed pockets of swelling in the right axillary region and near the sternum but the swelling will resolve after a few days. Lymphedema Life Impact Scale: Score of 7 and impairment percentage of 10%. See scanned document. Past Medical History: No past medical history on file. Past Surgical History:   Procedure Laterality Date    HX HYSTERECTOMY      HX TONSILLECTOMY     Per patient report:  Node removed from R axilla in June 2015, chemotherapy that began in June 2015. B mastectomies with B axillary node dissection with reconstructive surgery, expander/implant exchange 2/2016, and final surgery in mid 2016 for nipple and areola surgery late May 2016. Current Medications:    No current outpatient prescriptions on file. No current facility-administered medications for this encounter.     Per patient report:   Lexapro 10 mg once a day Well Child Visit for Newborns   AMBULATORY CARE:   A well child visit  is when your child sees a pediatrician to prevent health problems. Well child visits are used to track your child's growth and development. It is also a time for you to ask questions and to get information on how to keep your child safe. Write down your questions so you remember to ask them. Your child should have regular well child visits from birth to 16 years. Development milestones your  may reach:   Respond to sound, faces, and bright objects that are near him or her    Grasp a finger placed in his or her palm    Have rooting and sucking reflexes, and turn his or her head toward a nipple    React in a startled way by throwing his or her arms and legs out and then curling them in    What you can do when your baby cries: These actions may help calm your baby when he or she cries:  Hold your baby skin to skin and rock him or her, or swaddle him or her in a soft blanket. Gently pat your baby's back or chest. Stroke or rub his or her head. Quietly sing or talk to your baby, or play soft, soothing music. Put your baby in his or her car seat and take him or her for a drive, or go for a stroller ride. Burp your baby to get rid of extra gas. Give your baby a soothing, warm bath. What you need to know about feeding your : The following are general guidelines. Talk to your pediatrician if you have any questions or concerns about feeding your :  Feed your  only breast milk or formula for 4 to 6 months. Do not give your  anything other than breast milk. He or she does not need water or any other food at this age. Feed your  8 to 12 times each day. He or she will probably want to drink every 2 to 4 hours. Wake your baby to feed him or her if he or she sleeps longer than 4 to 5 hours. If your  is sleeping and it is time to feed, lightly rub your finger across his or her lips. Tamoxifen 20 mg once a day                                    Claritin 10 mg once a day                                     Vagifem 3 times a week                     Multivitamin once a day     Biotin     Clonazepam                                   Allergies: Allergies   Allergen Reactions    Tetracycline Rash    Per patient report:  Codeine, sulfa, dexamethasone, Compazine, adhesive tape     Prior Level of Function/Social/Work History/Personal factors and/or comorbidities impacting plan of care: 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 She is  and has a family and is performing normal housework and activities in the home. She enjoys gardening. She has been inconsistent with exercise due to time constraints. She has had swelling in her R thumb almost constantly but has done well with kinesio taping. She has developed swelling along her R axilla and sternum which tends to resolve after a few days. She also has the history of B mastectomies, B axillary lymph node dissection and reconstructive surgery   Living Situation: , lives with family, full time student      Trainable Caregiver?: yes, family if needed   Self-care/ADLs:  I     Mobility: I   Sleeping Arrangement:  In a bed   Adaptive Equipment Owned: none   Other: pt has had difficulty getting the correct compression garments from the vendor, [address] [place] [address]  Previous Therapy:  June-September 2016 in our lymphedema clinic  Compression/Lymphedema Equipment:  Lymphediva arm sleeves and gloves/gauntlets in size small, long                                                                                 Flexitouch    SUBJECTIVE:  Pt finally received compression garments four months after they were ordered from [address] [place] [address] due to many issues with the vendor. The compression garments fit well and she has been wearing them with high risk activities.  Her exercise has You can also undress him or her or change his or her diaper. At 3 to 4 days after birth, your  may eat every 1 to 2 hours. Your  will return to eating every 2 to 4 hours when he or she is 4 week old. Your baby may let you know when he or she is ready to eat. He or she may be more awake and may move more. He or she may put his or her hands up to his or her mouth. He or she may make sucking noises. Crying is normally a late sign that your baby is hungry. Do not use a microwave to heat your baby's bottle. The milk or formula will not heat evenly and will have spots that are very hot. Your baby's face or mouth could be burned. You can warm the milk or formula quickly by placing the bottle in a pot of warm water for a few minutes. Your  will give you signs when he or she has had enough. Stop feeding him or her when he or she shows signs that he or she is no longer hungry. He or she may turn his or her head away, seal his or her lips, spit out the nipple, or stop sucking. Your  may fall asleep near the end of a feeding. If this happens, do not wake him or her. Do not overfeed your baby. Overfeeding means your baby gets too many calories during a feeding. This may cause him or her to gain weight too fast. Do not try to continue to feed your baby when he or she is no longer hungry. What you need to know about breastfeeding your :   Breast milk has many benefits for your . Your breasts will first produce colostrum. Colostrum is rich in antibodies (proteins that protect your baby's immune system). Breast milk starts to replace colostrum 2 to 4 days after your baby's birth. Breast milk contains the protein, fat, sugar, vitamins, and minerals that your  needs to grow. Breast milk protects your  against allergies and infections. It may also decrease your 's risk for sudden infant death syndrome (SIDS).      Find a comfortable way to hold your baby during breastfeeding. Ask your pediatrician for more information on how to hold your baby during breastfeeding. Your  should have 6 to 8 wet diapers every day. The number of wet diapers will let you know that your  is getting enough breast milk. Your  may have 3 to 4 bowel movements every day. Your 's bowel movements may be loose. Do not give your baby a pacifier until he or she is 3to 7 weeks old. The use of a pacifier at this time may make breastfeeding difficult for your baby. Get support and more information about breastfeeding your . American Academy of 504   [facility] , [address] [facility]  Phone: 9- 585 - [phone]  Web Address: http://www.[employer]/  [facility] [address] , [address] [place] [address]  Phone: 6- 771 - [phone]  Phone: 6- 425 - [phone]  Web Address: http://www.[employer]/. org  How to help your baby latch on correctly:  Help your baby move his or her head to reach your breast. Hold the nape of his or her neck to help him or her latch onto your breast. Touch his or her top lip with your nipple and wait for him or her to open his or her mouth wide. Your baby's lower lip and chin should touch the areola (dark area around the nipple) first. Help him or her get as much of the areola in his or her mouth as possible. You should feel as if your baby will not separate from your breast easily. A correct latch helps your baby get the right amount of milk at each feeding. Allow your baby to breastfeed for as long as he or she is able. Signs of a correct latch-on:   You can hear your baby swallow. Your baby is relaxed and takes slow, deep mouthfuls. Your breast or nipple does not hurt during breastfeeding. Your baby is able to suckle milk right away after he or she latches on. Your nipple is the same shape when your baby is done breastfeeding.     Your breast is smooth, with no been limited due to time constraints from school and planning her son's wedding. She has not completed her self MLD or home exercise program consistently. She is aware that she has gained weight and plans to start an exercise program soon. Patient is reporting new swelling along R axilla and lateral sternum that will resolve after a few days. She does have a compression shirt she can wear as needed. Patients goals for therapy: \"follow up now that I have received the garments to make sure that they are the correct size\"    OBJECTIVE DATA SUMMARY:   EXAMINATION/PRESENTATION/DECISION MAKING:   Pain:   No complaints of pain. Self-Care and ADLs:  Grooming: I Bathing: showers   UB Dressing: I LB Dressing: I   Don/Doff Shoes/Socks: I Toileting: I   Other: n/a    Skin and Tissue Assessment:  Dermal Status:    (x )  Intact ( )  Dry   ( )  Tenuous ( )  Flaky   ( )  Wound/lesion present ( )  Scars   ( )  Dermatitis    Texture/Consistency:  ( x)  Boggy-R axilla and upper lateral chest wall which is fairly new in onset,  R thumb ( )  Pitting Edema   ( )  Brawny ( )  Combination   ( )  Fibrotic/Woody    Pigmentation/Color Change:  ( x)  Normal ( )  Hemosiderin   ( )  Red ( )  Erythematous   ( )  Hyperpigmented ( )  Hyperlipodermatosclerosis   Anomalies:  ( )  Lymphorrhea ( )  Vesicles   ( )  Petechiae ( )  Warty Vercusis   ( )  Bullae ( )  Papilloma   Circulatory:  ( )  LATANYA ( )  Varicosities:   ( )  Pulses ( )  Vascular studies ruled out DVT in past   ( )  DVT History    Nails:  (x )  Normal  ( )  Fungus  Stemmers Sign: negative  Height:  Height: 5' 8\" (172.7 cm)  Weight:  Weight: 76.8 kg (169 lb 6.4 oz)   BMI:  BMI (calculated): 25.8  (36 or greater: adversely affecting lymphedema)  Wound/Ulcer:  none      Wound Pain:   n/a  Volumetric Measurements: UEs  Right:  2693.44 mL (gain of 291 ml since 3/27/2017) Left:  2,552. 86 mL (gain of 337 ml since 3/27/2017)   % Difference: 5.51 (decrease from 8.42% since 3/27/2017) Dominance: R     Weight gain of 11 lbs since 3/3/2017         (See scanned graph)  Range of Motion: functional times 4 extremities  Strength:  WNL  Sensation:  Intact    Mobility:    Bed/Chair Mobility:  I Transfers: I   Sitting Balance:  normal Standing Balance:  good   Gait:  I gait without any assistive device for community distances Wheelchair Mobility:  n/a   Endurance:  good Stairs: I   Safety:  Patient is alert and oriented:  yes   Safety awareness:  good   Fall Risk?:  no   Patient given written fall prevention handout: Yes   Precautions:  Standard lymphedema precautions to include avoiding blood pressure readings, injections and IVs or other procedures/acts that could lead to broken skin on affected area, and avoiding excessive heat, resistive activity or altitude without compression garment      Based on the above components, the patient evaluation is determined to be of the following complexity level: LOW     Evaluation Time: 9:40 - 10:00 am  TREATMENT PROVIDED:   1. Treatment description:  Therapeutic Exercise and Procedure: Patient instructed in activity restrictions and exercise guidelines. Therapist demonstrated deep abdominal breathing and a discussion of her home program of lymphedema exercise routines occurred. She has been inconsistent with her home lymphedema exercise program.  She still has her written handouts and has good knowledge of the exercises and is more than willing to get back to daily performance of one set of exercises. She plans to begin a daily walking program also. Treatment time:  10:13 -10:20 am  Minutes: 7    2. Treatment description:  Manual Therapy: Patient instructed in skin care principles and anatomy of the lymphatic system. Therapist able to demonstrate manual lymphatic drainage techniques for the drainage of B UEs and skin care protocol. Pt has been performing daily skin care and has a preventative antibiotic for cellulitis if needed.  She has been inconsistent wrinkles or dimples where your baby is latched on. What you need to know about feeding your baby formula:   Ask your baby's pediatrician which formula to feed your . Your  may need formula that contains iron. The different types of formulas include cow's milk, soy, and other formulas. Some formulas are ready to drink, and some need to be mixed with water. Ask your pediatrician how to prepare your 's formula. Hold your  upright during bottle-feeding. You may be comfortable feeding your  while sitting in a rocking chair or an armchair. Put a pillow under your arm for support. Gently wrap your arm around your 's upper body, supporting his or her head with your arm. Be sure your baby's upper body is higher than his or her lower body. Do not prop a bottle in your 's mouth or let him or her lie flat during feeding. This may cause him or her to choke. Your  may drink about 2 to 4 ounces of formula at each feeding. Your  may want to drink a lot one day and not want to drink much the next. Do not add baby cereal to the bottle. Overfeeding can happen if you add baby cereal to formula or breast milk. You can make more if your baby is still hungry after he or she finishes a bottle. Wash bottles and nipples with soap and hot water. Use a bottle brush to help clean the bottle and nipple. Rinse with warm water after cleaning. Let bottles and nipples air dry. Make sure they are completely dry before you store them in cabinets or drawers. How to burp your :  Burp your  when you switch breasts or after every 2 to 3 ounces from a bottle. Burp him or her again when he or she is finished eating. Your  may spit up when he or she burps. This is normal. Hold your baby in any of the following positions to help him or her burp:  Hold your  against your chest or shoulder. Support his or her bottom with one hand.  Use your other hand to pat or rub his or her back gently. Sit your  upright on your lap. Use one hand to support his or her chest and head. Use the other hand to pat or rub his or her back. Place your  across your lap. He or she should face down with his or her head, chest, and belly resting on your lap. Hold him or her securely with one hand and use your other hand to rub or pat his or her back. How to lay your  down to sleep: It is very important to lay your  down to sleep in safe surroundings. This can greatly reduce his or her risk for SIDS. Tell grandparents, babysitters, and anyone else who cares for your  the following rules:  Put your  on his or her back to sleep. Do this every time he or she sleeps (naps and at night). Do this even if your baby sleeps more soundly on his or her stomach or side. Your  is less likely to choke on spit-up or vomit if he or she sleeps on his or her back. Put your  on a firm, flat surface to sleep. Your  should sleep in a crib, bassinet, or cradle that meets the safety standards of the Consumer Product Safety Commission (CPSC). Do not let him or her sleep on pillows, waterbeds, soft mattresses, quilts, beanbags, or other soft surfaces. Move your baby to his or her bed if he or she falls asleep in a car seat, stroller, or swing. He or she may change positions in a sitting device and not be able to breathe well. Put your  to sleep in a crib or bassinet that has firm sides. The rails around your 's crib should not be more than 2? inches apart. A mesh crib should have small openings less than ¼ of an inch. Put your  in his or her own bed. A crib or bassinet in your room, near your bed, is the safest place for your baby to sleep. Never let him or her sleep in bed with you. Never let him or her sleep on a couch or recliner.      Do not leave soft objects or loose bedding in his or her in performing self MLD but has been educated on the technique and has the written instructions to follow. She has not used her flexitouch for the last 3 weeks due to time constraints but plans to use it today and be more consistent with using it in the evenings after school. She has brought in her compression arm sleeves, gloves, and gauntlets that she received 4 months after they were ordered from [facility].   The compression garments fit well and are comfortable to the patient. She has been wearing her LUE/RUE garments during high risk activities, such as, exercise, yard work, writing/typing at school. She does not feel that she will need any additional garments for several months. Treatment time:  10:00-10:13 am  Minutes: 13    ASSESSMENT:   Sinan Carbone is a 47 y.o. female who presents with stage 2 R UE/ truncal secondary lymphedema and stage 1 L UE secondary lymphedema. Patient will benefit from complete decongestive therapy (CDT) including manual lymphatic drainage (MLD) technique, short-stretch textile bandages/compression system to decongest limb, and kinesiotaping as appropriate. Patient will receive instruction in proper skin care to recognize signs/symptoms of and prevent infection, therapeutic exercise, and self-MLD for independent home program and restorative lymphatic performance. This care is medically necessary due to the infection risk with lymphedema and to improve functional activities. CDT is necessary to resolve swelling to allow patient to return to wearing normal clothes and prevent worsening of symptoms, such as infections or hospitalizations. Patient will be independent with home program strategies to allow improved ADL ability and mobility and to allow patient to return to greatest functional independence. Rehabilitation potential is considered to be good.   Factors which may influence rehabilitation potential include good knowledge of her home program.  Patient will benefit from 6 month evaluation in physical therapy to optimize improvement in these areas. PLAN OF CARE:   Recommendations and Planned Interventions:  Manual lymph drainage/complete decongestive therapy  Compression garment fitting/provision  Lymphedema therapeutic exercise  AROM/PROM/Strength/Coordination  Self-care training  Education in skin care and lymphedema precautions  Self-MLD education per home program  Self-bandaging education per home program       GOALS    1. Pt will continue with home program of remedial lymphedema exercises, self MLD and use of the flexitouch, compression garments for high risk activities, and skin care. Patient has participated in goal setting and agrees to work toward plan of care. Patient was instructed to call if questions or concerns arise. She plans to call the clinic when new garments are needed. Thank you for this referral.  Angie Cleary, PT   Time Calculation: 40 mins    TREATMENT PLAN EFFECTIVE DATES:   9/13/2017 TO 9/13/2017  I have read the above plan of care for Michelle Quintana. I certify the above prescribed services are required by this patient and are medically necessary. The above plan of care has been developed in conjunction with the lymphedema/physical therapist.       Physician Signature: ____________________________________Date:______________                                               Caitlin Carr.  Jayden Kebede MD crib.  His or her bed should contain only a mattress covered with a fitted bottom sheet. Use a sheet that is made for the mattress. Do not put pillows, bumpers, comforters, or stuffed animals in his or her bed. Dress your  in a sleep sack or other sleep clothing before you put him or her down to sleep. Do not use loose blankets. If you must use a blanket, tuck it around the mattress. Do not let your  get too hot. Keep the room at a temperature that is comfortable for an adult. Never dress him or her in more than 1 layer more than you would wear. Do not cover your baby's face or head while he or she sleeps. Your  is too hot if he or she is sweating or his or her chest feels hot. Do not raise the head of your 's bed. Your  could slide or roll into a position that makes it hard for him or her to breathe. Keep your  safe:   Do not give your baby medicine unless directed by his or her pediatrician. Ask for directions if you do not know how to give the medicine. If your baby misses a dose, do not double the next dose. Ask how to make up the missed dose. Do not give aspirin to children younger than 18 years. Your child could develop Reye syndrome if he or she has the flu or a fever and takes aspirin. Reye syndrome can cause life-threatening brain and liver damage. Check your child's medicine labels for aspirin or salicylates. Never shake your  to stop his or her crying. This can cause blindness or brain damage. It can be hard to listen to your  cry and not be able to calm him or her down. Place your  in his or her crib or playpen if you feel frustrated or upset. Call a friend or family member and tell them how you feel. Ask for help and take a break if you feel stressed or overwhelmed. Never leave your  in a playpen or crib with the drop-side down. Your  could fall and be injured.  Make sure that the drop-side is locked in place. Always keep one hand on your  when you change his or her diapers or dress him or her. This will prevent him or her from falling from a changing table, counter, bed, or couch. Always put your  in a rear-facing car seat. The car seat should always be in the back seat. Make sure you have a safety seat that meets the federal safety standards. It is very important to install the safety seat properly in your car and to always use it correctly. The harness and straps should be positioned to prevent your baby's head from falling forward. Ask for more information about  safety seats. Do not smoke near your . Do not let anyone else smoke near your . Do not smoke in your home or vehicle. Smoke from cigarettes or cigars can cause asthma or breathing problems in your . Take an infant CPR and first aid class. These classes will help teach you how to care for your baby in an emergency. Ask your baby's pediatrician where you can take these classes. How to care for your 's skin:   Sponge bathe your  with warm water and a cleanser made for a baby's skin. Do not use baby oil, creams, or ointments. These may irritate your baby's skin or make skin problems worse. Wash your baby's head and scalp every day. This may prevent cradle cap. Do not bathe your baby in a tub or sink until his or her umbilical cord has fallen off. Ask for more information on sponge bathing your baby. Use moisturizing lotions on your 's dry skin. Ask your pediatrician which lotions are safe to use on your 's skin. Do not use powders. Prevent diaper rash. Change your 's diaper frequently. Clean your 's bottom with a wet washcloth or diaper wipe. Do not use diaper wipes if your baby has a rash or circumcision that has not yet healed. Gently lift both legs and wash his or her buttocks. Always wipe from front to back.  Clean under all skin folds and between creases. Let his or her skin air dry before you replace his or her diaper. Ask your 's pediatrician about creams and ointments that are safe to use on his or her diaper area. Use a wet washcloth or cotton ball to clean the outer part of your 's ears. Do not put cotton swabs into your 's ears. These can hurt his or her ears and push earwax in. Earwax should come out of your 's ear on its own. Talk to your baby's pediatrician if you think your baby has too much earwax. Keep your 's umbilical cord stump clean and dry. Your baby's umbilical cord stump will dry and fall off in about 7 to 21 days, leaving a bellybutton. If your baby's stump gets dirty from urine or bowel movement, wash it off right away with water. Gently pat the stump dry. This will help prevent infection around your baby's cord stump. Fold the front of the diaper down below the cord stump to let it air dry. Do not cover or pull at the cord stump. Call your 's pediatrician if the stump is red, draining fluid, or has a foul odor. Keep your  boy's circumcised area clean. Your baby's penis may have a plastic ring that will come off within 8 days. His penis may be covered with gauze and petroleum jelly. Gently blot or squeeze warm water from a wet cloth or cotton ball onto the penis. Do not use soap or diaper wipes to clean the circumcision area. This could sting or irritate your baby's penis. Your baby's penis should heal in 7 to 10 days. Keep your  out of the sun. Your 's skin is sensitive. He or she may be easily burned. Cover your 's skin with clothing if you need to take him or her outside. Keep him or her in the shade as much as possible. Only apply sunscreen to your baby if there is no shade. Ask your pediatrician what sunscreen is safe to put on your baby.     How to clean your 's eyes and nose:   Use a rubber bulb syringe to suction your 's nose if he or she is stuffed up. Point the bulb syringe away from his or her face and squeeze the bulb to create a vacuum. Gently put the tip into one of your 's nostrils. Close the other nostril with your fingers. Release the bulb so that it sucks out the mucus. Repeat if necessary. Boil the syringe for 10 minutes after each use. Do not put your fingers or cotton swabs into your 's nose. Massage your 's tear ducts as directed. A blocked tear duct is common in newborns. A sign of a blocked tear duct is a yellow sticky discharge in one or both of your 's eyes. Your 's pediatrician may show you how to massage your 's tear ducts to unplug them. Do not massage your 's tear ducts unless his or her pediatrician says it is okay. Prevent your  from getting sick:   Wash your hands before you touch your . Use an alcohol-based hand  or soap and water. Wash your hands after you change your 's diaper and before you feed him or her. Ask all visitors to wash their hands before they touch your . Have them use an alcohol-based hand  or soap and water. Tell friends and family not to visit your  if they are sick. Keep your  away from crowded places. Do not bring your  to crowded places such as the mall, restaurant, or movie theater. Your 's immune system is not strong and he or she can easily get sick. What you can do to care for yourself and your family:   Sleep when your baby sleeps. Your baby may feed often during the night. Get rest during the day while your baby sleeps. Ask for help from family and friends. Caring for a  can be overwhelming. Talk to your family and friends. Tell them what you need them to do to help you care for your baby. Take time for yourself and your partner. Plan for time alone with your partner.  Find ways to relax such as watching a movie, listening to music, or going for a walk together. You and your partner need to be healthy so you can care for your baby. Let your other children help with the care of your . This will help your other children feel loved and cared about. Let them help you feed the baby or bathe him or her. Never leave the baby alone with other children. Spend time alone with your other children. Do activities with them that they enjoy. Ask them how they feel about the new baby. Answer any questions or concerns that they have about the new baby. Try to continue family routines. Join a support group. It may be helpful to talk with other new parents. What you need to know about your 's next well child visit:  Your 's pediatrician will tell you when to bring him or her in again. The next well child visit is usually at 1 or 2 weeks. Contact your 's pediatrician if you have any questions or concerns about your baby's health or care before the next visit. Your  may need vaccines at the next well child visit. Your provider will tell you which vaccines your  needs and when he or she should get them. © Copyright [facility]  Information is for End User's use only and may not be sold, redistributed or otherwise used for commercial purposes. The above information is an  only. It is not intended as medical advice for individual conditions or treatments. Talk to your doctor, nurse or pharmacist before following any medical regimen to see if it is safe and effective for you.

## 2023-08-03 ENCOUNTER — TRANSCRIBE ORDERS (OUTPATIENT)
Facility: HOSPITAL | Age: 58
End: 2023-08-03

## 2023-08-03 DIAGNOSIS — I89.0 LYMPHEDEMA: ICD-10-CM

## 2023-08-03 DIAGNOSIS — Z90.13 H/O BILATERAL MASTECTOMY: ICD-10-CM

## 2023-08-03 DIAGNOSIS — C50.811 CANCER OF OVERLAPPING SITES OF RIGHT BREAST (HCC): Primary | ICD-10-CM

## 2023-08-09 ENCOUNTER — HOSPITAL ENCOUNTER (OUTPATIENT)
Facility: HOSPITAL | Age: 58
Setting detail: RECURRING SERIES
Discharge: HOME | End: 2023-08-12
Payer: COMMERCIAL

## 2023-08-09 PROCEDURE — 97140 MANUAL THERAPY 1/> REGIONS: CPT

## 2023-08-09 PROCEDURE — 97110 THERAPEUTIC EXERCISES: CPT

## 2023-08-09 PROCEDURE — 97165 OT EVAL LOW COMPLEX 30 MIN: CPT

## 2023-08-09 NOTE — THERAPY EVALUATION
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] Treatment Diagnosis:  I89.0     Lymphedema, not elsewhere classified    Referral Source:  Veronica Merna* Provider #:  [id number]                Insurance: Payor: Geradine Romp / Plan: Geradine Romp / Product Type: *No Product type* /      Patient  verified yes     Visit #   Current  / Total 1 20   Time   In / Out 11:15AM 12:30PM   Total Treatment Time 75   Total Timed Codes 75         SUBJECTIVE  Pain Level (0-10 scale): 0/10  []constant []intermittent []improving []worsening [x]no change since onset    Any medication changes, allergies to medications, adverse drug reactions, diagnosis change, or new procedure performed?: [x] No    [] Yes (see summary sheet for update)  Medications: Verified on Patient Summary List    Subjective functional status/changes:     Pt states she cording began about a year ago. Pt thought it was capsular contraction, surgeon suspected scar tissue. Last week, Dr. Sundeep Alex identified axillary cording and referred to lymphedema clinic. Start of Care: 2023  Onset Date: identified 2023  Current symptoms/Complaints: tightness, decrease in shoulder flexion and abduction  Mechanism of Injury: lymph node dissection in R axillary   PLOF: independent  Limitations to PLOF/Activity or Recreational Limitations: Pt reports discomfort with overhead reaching, and decreased coordination with writing implements and decreased quality of handwriting due to swelling in R thumb.  She remains independent with self care and

## 2023-08-10 ENCOUNTER — HOSPITAL ENCOUNTER (OUTPATIENT)
Facility: HOSPITAL | Age: 58
Discharge: HOME | End: 2023-08-10
Attending: PLASTIC SURGERY
Payer: COMMERCIAL

## 2023-08-10 VITALS — WEIGHT: 163 LBS

## 2023-08-10 DIAGNOSIS — C50.911 MALIGNANT NEOPLASM OF RIGHT FEMALE BREAST, UNSPECIFIED ESTROGEN RECEPTOR STATUS, UNSPECIFIED SITE OF BREAST (HCC): ICD-10-CM

## 2023-08-10 DIAGNOSIS — Z90.13 STATUS POST BILATERAL MASTECTOMY: ICD-10-CM

## 2023-08-10 DIAGNOSIS — N65.0 DEFORMITY OF RECONSTRUCTED BREAST: ICD-10-CM

## 2023-08-10 PROCEDURE — 6360000004 HC RX CONTRAST MEDICATION: Performed by: RADIOLOGY

## 2023-08-10 PROCEDURE — A9585 GADOBUTROL INJECTION: HCPCS | Performed by: RADIOLOGY

## 2023-08-10 PROCEDURE — C8908 MRI W/O FOL W/CONT, BREAST,: HCPCS

## 2023-08-10 RX ORDER — GADOTERATE MEGLUMINE 376.9 MG/ML
INJECTION INTRAVENOUS
Status: DISCONTINUED
Start: 2023-08-10 | End: 2023-08-10

## 2023-08-10 RX ADMIN — GADOBUTROL 7 ML: 604.72 INJECTION INTRAVENOUS at 13:01

## 2023-08-28 ENCOUNTER — HOSPITAL ENCOUNTER (OUTPATIENT)
Facility: HOSPITAL | Age: 58
Setting detail: RECURRING SERIES
Discharge: HOME | End: 2023-08-31
Payer: COMMERCIAL

## 2023-08-28 PROCEDURE — 97535 SELF CARE MNGMENT TRAINING: CPT

## 2023-08-28 PROCEDURE — 97140 MANUAL THERAPY 1/> REGIONS: CPT

## 2023-08-31 ENCOUNTER — APPOINTMENT (OUTPATIENT)
Facility: HOSPITAL | Age: 58
End: 2023-08-31
Payer: COMMERCIAL

## 2023-09-01 ENCOUNTER — APPOINTMENT (OUTPATIENT)
Facility: HOSPITAL | Age: 58
End: 2023-09-01
Payer: COMMERCIAL

## 2023-09-07 ENCOUNTER — HOSPITAL ENCOUNTER (OUTPATIENT)
Facility: HOSPITAL | Age: 58
Setting detail: RECURRING SERIES
Discharge: HOME | End: 2023-09-10
Payer: COMMERCIAL

## 2023-09-07 PROCEDURE — 97110 THERAPEUTIC EXERCISES: CPT

## 2023-09-07 PROCEDURE — 97140 MANUAL THERAPY 1/> REGIONS: CPT

## 2023-09-07 PROCEDURE — 97535 SELF CARE MNGMENT TRAINING: CPT

## 2023-09-07 NOTE — PROGRESS NOTES
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: [x] No    [] Yes (see summary sheet for update)  Medications: Verified on Patient Summary List    Subjective functional status/changes:   Pt states she feels she has gained PROM in her R shoulder since first session when therapist performed soft tissue manipulation and instructed pt in axillary web exercises which she has been performing. OBJECTIVE    Therapeutic Procedures: Tx Min Billable or 1:1 Min (if diff from Tx Min) Procedure, Rationale, Specifics   10 [id number] Self Care/Home Management (timed):  improve patient knowledge and understanding of pain reducing techniques, positioning, posture/ergonomics, diagnosis/prognosis, and garment use and care  to improve patient's ability to progress to PLOF and address remaining functional goals. (see flow sheet as applicable)    Details if applicable:    Therapist discussed goals with pt and reviewed signs and symptoms of infection, as well as how to decrease risk; reviewed skin care. Therapist reviewed benefits of compression bras and tanks; pt reported she had purchased a bra;  Therapist also discussed use of swell spots in UE garments to

## 2023-09-11 ENCOUNTER — HOSPITAL ENCOUNTER (OUTPATIENT)
Facility: HOSPITAL | Age: 58
Setting detail: RECURRING SERIES
Discharge: HOME | End: 2023-09-14
Payer: COMMERCIAL

## 2023-09-11 PROCEDURE — 97140 MANUAL THERAPY 1/> REGIONS: CPT

## 2023-09-11 NOTE — PROGRESS NOTES
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 It tightens between sessions, but not back to where it was. \"  \"I think I am noticing that my R arm may be a little bigger. \"    Pain Level (0-10 scale): 0/10    Any medication changes, allergies to medications, adverse drug reactions, diagnosis change, or new procedure performed?: [x] No    [] Yes (see summary sheet for update)  Medications: Verified on Patient Summary List    Subjective functional status/changes:   Pt states she feels she has gained PROM in her R shoulder since first session when therapist performed soft tissue manipulation and instructed pt in axillary web exercises which she has been performing. OBJECTIVE    Therapeutic Procedures: Tx Min Billable or 1:1 Min (if diff from Tx Min) Procedure, Rationale, Specifics   45 [phone] [id number] Manual Therapy (timed):  decrease pain, increase ROM, increase tissue extensibility, decrease edema, and decrease trigger points to improve patient's ability to progress to PLOF and address remaining functional goals. The manual therapy interventions were performed at a separate and distinct time from the therapeutic activities interventions .  (see flow sheet as applicable)    Details if applicable:    Therapist performed soft tissue mobilization of R UE

## 2023-09-12 NOTE — PROGRESS NOTES
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 She participated in abbreviated session due to arriving late, but wanting to be home at her regular time. Was able to complete soft tissue mobilization throughout RUE and into upper R breast. Pt is tolerating treatments well. She continues to report improved ROM and decreased pain in R UE following each session. Toward end of session, pt noted that she feels her R UE may be a little larger than previously and therapist agreed to perform volume measurements at next session. Patient will continue to benefit from skilled PT / OT services to address ROM deficits, address swelling, analyze and address soft tissue restrictions, analyze compression product fit and use, instruct in home lymphedema management program, and modify and progress therapeutic interventions to address functional deficits and attain remaining goals. CURRENT STATUS  Short Term Goals: To be accomplished in 12 treatments  Patient will demonstrate decreased RUE volumetric measurements from 2525.07 ml to 2475 ml, in order to reduce risk for infection, decrease feeling of limb heaviness and tightness, and decrease swelling's impact on pt's body image. Status at last Eval/Progress Note: initiated  Current Status: in progress  Goal Met? No    2.    Patient to perform 5/5 axillary web exercises in session with modified independence utilizing HEP handout, in order to promote optimal independence

## 2023-09-14 ENCOUNTER — APPOINTMENT (OUTPATIENT)
Facility: HOSPITAL | Age: 58
End: 2023-09-14
Payer: COMMERCIAL

## 2023-09-18 ENCOUNTER — HOSPITAL ENCOUNTER (OUTPATIENT)
Facility: HOSPITAL | Age: 58
Setting detail: RECURRING SERIES
Discharge: HOME | End: 2023-09-21
Payer: COMMERCIAL

## 2023-09-18 PROCEDURE — 97140 MANUAL THERAPY 1/> REGIONS: CPT

## 2023-09-18 NOTE — PROGRESS NOTES
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.2] Treatment Diagnosis:  I89.0     Lymphedema, not elsewhere classified and I97.2     Post-Mastectomy lymphedema syndrome    Referral Source:  Rhoda Hinojosa, [address]:   Payor: Martellchaparrita Kaier / Plan: Gearold Duster / Product Type: *No Product type* /                     Patient  verified yes     Visit #   Current  / Total 5 20   Time   In / Out 3:20 PM  4:15 PM   Total Treatment Time 55   Total Timed Codes 55         SUBJECTIVE      Pain Level (0-10 scale):  0/10    Any medication changes, allergies to medications, adverse drug reactions, diagnosis change, or new procedure performed?: [x] No    [] Yes (see summary sheet for update)  Medications: Verified on Patient Summary List    Subjective functional status/changes:   Pt states she feels she has gained PROM in her R shoulder since first session when therapist performed soft tissue manipulation and instructed pt in axillary web exercises which she has been performing. OBJECTIVE    Therapeutic Procedures: Tx Min Billable or 1:1 Min (if diff from Tx Min) Procedure, Rationale, Specifics   55 24 [id number] Manual Therapy (timed):  decrease pain, increase ROM, increase tissue extensibility, decrease edema, and decrease trigger points to improve patient's ability to progress to PLOF and address remaining functional goals. The manual therapy interventions were performed at a separate and distinct time from the therapeutic activities interventions . (see flow sheet as applicable)    Details if applicable:    Therapist performed soft tissue mobilization of R UE from elbow to breast to provide relief from R axillary web syndrome.  Therapist was able to palpate slightly tightness and brawniness in R upper breast today; pt stated

## 2023-09-21 ENCOUNTER — APPOINTMENT (OUTPATIENT)
Facility: HOSPITAL | Age: 58
End: 2023-09-21
Payer: COMMERCIAL

## 2023-09-25 ENCOUNTER — HOSPITAL ENCOUNTER (OUTPATIENT)
Facility: HOSPITAL | Age: 58
Setting detail: RECURRING SERIES
Discharge: HOME | End: 2023-09-28
Payer: COMMERCIAL

## 2023-09-25 PROCEDURE — 97140 MANUAL THERAPY 1/> REGIONS: CPT

## 2023-09-25 PROCEDURE — 97535 SELF CARE MNGMENT TRAINING: CPT

## 2023-09-28 ENCOUNTER — APPOINTMENT (OUTPATIENT)
Facility: HOSPITAL | Age: 58
End: 2023-09-28
Payer: COMMERCIAL

## 2023-10-02 ENCOUNTER — HOSPITAL ENCOUNTER (OUTPATIENT)
Facility: HOSPITAL | Age: 58
Setting detail: RECURRING SERIES
Discharge: HOME | End: 2023-10-05
Payer: COMMERCIAL

## 2023-10-02 PROCEDURE — 97535 SELF CARE MNGMENT TRAINING: CPT

## 2023-10-02 PROCEDURE — 97140 MANUAL THERAPY 1/> REGIONS: CPT

## 2023-10-02 NOTE — PROGRESS NOTES
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 Therapist was able to provide soft tissue mobilization throughout RUE and into upper R breast. Therapist completed education regarding all components of home management with pt to prepare for discharge including skin care, HEP schedule, and proper use of compression garments and vasopneumatic pump. Pt has tolerated treatments well. She no longer reporting functional limitations resulting from R axillary web. AROM WNL, though pt continues to endorse mild tightness in axilla at full shoulder flexion. Pt's LLIS score today was 0/68 or 0%, compared to 3/68 or 4% at evaluation. And her UE volumes improved as follows:  Right Upper 2,012.31 ml today compared to 2,525.07 ml on initial evaluation. Left Upper  1,986.03 ml today compared to 2,350.47 ml on initial evaluation. Percent difference today is 1.32% as compared to 7.43% on evaluation. She is independent with axillary web exercises as well as garment and pump use at this time. Patient is appropriate for discharge at this time due to all goals have been met. CURRENT STATUS  Short Term Goals: To be accomplished in 12 treatments  Patient will demonstrate decreased RUE volumetric measurements from 2525.07 ml to 2475 ml, in order to reduce risk for infection, decrease feeling of limb heaviness and tightness, and decrease swelling's impact on pt's body image. Status at last Eval/Progress Note:   Current Status: MET  Goal Met? Yes    2.    Patient
dressing and overhead reaching during instrumental ADLs. - discontinued; pt declined being fitted for new garments stating she is happy with the garments she is currently using. PLAN  Re-Cert Due: 89/65/0218  []  Upgrade activities as tolerated  [x]  Discharge due to : All goals met.   []  Other:      Jose Maria Ibrahim, OT, CLT      10/2/2023       9:09 AM

## 2023-10-05 ENCOUNTER — APPOINTMENT (OUTPATIENT)
Facility: HOSPITAL | Age: 58
End: 2023-10-05
Payer: COMMERCIAL

## 2023-10-09 ENCOUNTER — APPOINTMENT (OUTPATIENT)
Facility: HOSPITAL | Age: 58
End: 2023-10-09
Payer: COMMERCIAL

## 2023-10-12 ENCOUNTER — APPOINTMENT (OUTPATIENT)
Facility: HOSPITAL | Age: 58
End: 2023-10-12
Payer: COMMERCIAL

## 2023-10-16 ENCOUNTER — APPOINTMENT (OUTPATIENT)
Facility: HOSPITAL | Age: 58
End: 2023-10-16
Payer: COMMERCIAL

## 2023-10-20 ENCOUNTER — APPOINTMENT (OUTPATIENT)
Facility: HOSPITAL | Age: 58
End: 2023-10-20
Payer: COMMERCIAL

## 2023-10-26 ENCOUNTER — APPOINTMENT (OUTPATIENT)
Facility: HOSPITAL | Age: 58
End: 2023-10-26
Payer: COMMERCIAL

## 2023-10-30 ENCOUNTER — APPOINTMENT (OUTPATIENT)
Facility: HOSPITAL | Age: 58
End: 2023-10-30
Payer: COMMERCIAL